# Patient Record
Sex: FEMALE | Race: WHITE | HISPANIC OR LATINO | Employment: PART TIME | URBAN - METROPOLITAN AREA
[De-identification: names, ages, dates, MRNs, and addresses within clinical notes are randomized per-mention and may not be internally consistent; named-entity substitution may affect disease eponyms.]

---

## 2022-12-16 ENCOUNTER — OFFICE VISIT (OUTPATIENT)
Dept: FAMILY MEDICINE CLINIC | Facility: CLINIC | Age: 22
End: 2022-12-16

## 2022-12-16 VITALS
DIASTOLIC BLOOD PRESSURE: 62 MMHG | BODY MASS INDEX: 19.79 KG/M2 | WEIGHT: 130.6 LBS | HEIGHT: 68 IN | SYSTOLIC BLOOD PRESSURE: 116 MMHG | TEMPERATURE: 97.5 F | OXYGEN SATURATION: 99 % | HEART RATE: 102 BPM

## 2022-12-16 DIAGNOSIS — F33.2 SEVERE EPISODE OF RECURRENT MAJOR DEPRESSIVE DISORDER, WITHOUT PSYCHOTIC FEATURES: ICD-10-CM

## 2022-12-16 DIAGNOSIS — J02.9 PHARYNGITIS, UNSPECIFIED ETIOLOGY: ICD-10-CM

## 2022-12-16 DIAGNOSIS — Z76.89 ENCOUNTER TO ESTABLISH CARE: Primary | ICD-10-CM

## 2022-12-16 DIAGNOSIS — F41.1 GENERALIZED ANXIETY DISORDER: ICD-10-CM

## 2022-12-16 PROCEDURE — 99204 OFFICE O/P NEW MOD 45 MIN: CPT

## 2022-12-16 RX ORDER — CEFDINIR 300 MG/1
300 CAPSULE ORAL 2 TIMES DAILY
Qty: 20 CAPSULE | Refills: 0 | Status: SHIPPED | OUTPATIENT
Start: 2022-12-16 | End: 2022-12-26

## 2022-12-16 RX ORDER — FLUOXETINE HYDROCHLORIDE 20 MG/1
20 CAPSULE ORAL DAILY
COMMUNITY
End: 2023-01-09

## 2022-12-16 RX ORDER — VILAZODONE HYDROCHLORIDE 20 MG/1
20 TABLET ORAL DAILY
Qty: 30 TABLET | Refills: 2 | Status: SHIPPED | OUTPATIENT
Start: 2022-12-16 | End: 2023-01-23

## 2022-12-16 RX ORDER — VILAZODONE HYDROCHLORIDE 10 MG/1
10 TABLET ORAL DAILY
Qty: 7 TABLET | Refills: 0 | Status: SHIPPED | OUTPATIENT
Start: 2022-12-16 | End: 2023-01-09

## 2022-12-16 RX ORDER — FLUOXETINE 10 MG/1
10 CAPSULE ORAL DAILY
Qty: 21 CAPSULE | Refills: 0 | Status: SHIPPED | OUTPATIENT
Start: 2022-12-16 | End: 2023-01-03

## 2022-12-16 RX ORDER — AZITHROMYCIN 250 MG/1
250 TABLET, FILM COATED ORAL DAILY
COMMUNITY
End: 2022-12-16

## 2022-12-16 NOTE — PROGRESS NOTES
Chief Complaint  Chief Complaint   Patient presents with   • Depression   • Anxiety   • Establish Care       Subjective      Juliet Pittman presents to [facility]  History of Present Illness  Patient presents today to establish care. She recently moved to the area from [place] with her Mother. She will be starting a new job at the [employer] soon and is looking forward to that.     She has a history of depression and anxiety. She has taken multiple medications in the past including Effexor, Zoloft, Lexapro and is currently taking Prozac 20 mg daily. She has been on this same dose for about a year and a half. Since moving here she feels more down and depressed. She has no appetite and no motivation. These previous medications were not effective for her depression.    She also reports significant anxiety. She has not taken any anti anxiety medications.     She had previously been on birth control, OCPs, but she stopped taking those and since has had irregular periods. She also has worsening of acne since stopping. She has taken accutane in the past but is not currently taking anything.  Patient wants to focus on her mental health at this time and in the future may want to be referred to dermatology for further treatment with Accutane.        GOLD - 7 Anxiety    Date data was collected: 12/16/2022    Over the last 2 weeks, how often have you been bothered by any of the following problems?    1. Feeling nervous, anxious or on edge: Nearly every day = 3   2. Not being able to stop or control worrying Nearly every day = 3   3. Worrying too much about different things: Nearly every day = 3   4. Trouble relaxing: Not at all = 0   5. Being so restless that it is hard to sit still: More than half the days = 2   6. Becoming easily annoyed or irritable: Several Days = 1   7. Feeling afraid as if something awful might happen: Several Days = 1    Total Score 13     If you checked off any problems,  "how difficult have these problems made it for you to do your work, take care of things at home or get along with other people? Very difficult    Total score at last visit: n/a  Date of last visit: n/a    ______________________________________________________________________  GOLD-7 SCORING CARD FOR SEVERITY DETERMINATION    Scoring -- add up all answers  Not at all =0; Serveral Days = 1; More than half the days - 2; Nearly every day =3      Interpretation of Total Score  Total Score Anxiety Severity   0-5 Mild   6-10 Moderate   11-15 Moderately Severe   15-21 Severe         Objective     Medical History:  Past Medical History:   Diagnosis Date   • Anxiety    • Depression      History reviewed. No pertinent surgical history.   Social History     Tobacco Use   • Smoking status: Never     Passive exposure: Never   • Smokeless tobacco: Never   Vaping Use   • Vaping Use: Never used   Substance Use Topics   • Alcohol use: Never   • Drug use: Never     Family History   Problem Relation Age of Onset   • Diabetes Father        Medications:  Prior to Admission medications    Medication Sig Start Date End Date Taking? Authorizing Provider   azithromycin (ZITHROMAX) 250 MG tablet Take 250 mg by mouth Daily.   Yes Provider, MD David   FLUoxetine (PROzac) 20 MG capsule Take 20 mg by mouth Daily.   Yes Provider, Historical, MD        Allergies:   Amoxicillin    Health Maintenance Due   Topic Date Due   • HPV VACCINES (1 - 2-dose series) Never done   • TDAP/TD VACCINES (1 - Tdap) Never done   • HEPATITIS C SCREENING  Never done   • ANNUAL PHYSICAL  Never done         Vital Signs:   /62   Pulse 102   Temp 97.5 °F (36.4 °C)   Ht 172.7 cm (68\")   Wt 59.2 kg (130 lb 9.6 oz)   SpO2 99%   BMI 19.86 kg/m²     Wt Readings from Last 3 Encounters:   12/16/22 59.2 kg (130 lb 9.6 oz)     BP Readings from Last 3 Encounters:   12/16/22 116/62       BMI is within normal parameters. No other follow-up for BMI required.   "     Physical Exam  Vitals reviewed.   Constitutional:       Appearance: Normal appearance. She is well-developed.   HENT:      Head: Normocephalic and atraumatic.      Right Ear: Tympanic membrane and external ear normal.      Left Ear: Tympanic membrane and external ear normal.      Mouth/Throat:      Pharynx: Pharyngeal swelling, oropharyngeal exudate and posterior oropharyngeal erythema present.      Tonsils: 1+ on the right. 1+ on the left.   Eyes:      Conjunctiva/sclera: Conjunctivae normal.      Pupils: Pupils are equal, round, and reactive to light.   Cardiovascular:      Rate and Rhythm: Normal rate and regular rhythm.      Heart sounds: No murmur heard.    No friction rub. No gallop.   Pulmonary:      Effort: Pulmonary effort is normal.      Breath sounds: Normal breath sounds. No wheezing or rhonchi.   Abdominal:      General: Bowel sounds are normal. There is no distension.      Palpations: Abdomen is soft.      Tenderness: There is no abdominal tenderness.   Skin:     General: Skin is warm and dry.   Neurological:      Mental Status: She is alert and oriented to person, place, and time.      Cranial Nerves: No cranial nerve deficit.   Psychiatric:         Mood and Affect: Affect normal. Mood is anxious.         Behavior: Behavior normal.         Thought Content: Thought content normal.         Judgment: Judgment normal.          Result Review :    The following data was reviewed by ISABELLE Tomlin on 12/16/22 at 16:48 EST:        No Images in the past 120 days found..                  Assessment and Plan    Diagnoses and all orders for this visit:    1. Encounter to establish care (Primary)    2. Severe episode of recurrent major depressive disorder, without psychotic features (HCC)  -     FLUoxetine (PROzac) 10 MG capsule; Take 1 capsule by mouth Daily.  Dispense: 21 capsule; Refill: 0  -     vilazodone (Viibryd) 10 MG tablet tablet; Take 1 tablet by mouth Daily.  Dispense: 7 tablet;  Refill: 0  -     vilazodone (Viibryd) 20 MG tablet tablet; Take 1 tablet by mouth Daily.  Dispense: 30 tablet; Refill: 2    3. Generalized anxiety disorder  -     FLUoxetine (PROzac) 10 MG capsule; Take 1 capsule by mouth Daily.  Dispense: 21 capsule; Refill: 0  -     vilazodone (Viibryd) 10 MG tablet tablet; Take 1 tablet by mouth Daily.  Dispense: 7 tablet; Refill: 0  -     vilazodone (Viibryd) 20 MG tablet tablet; Take 1 tablet by mouth Daily.  Dispense: 30 tablet; Refill: 2    4. Pharyngitis, unspecified etiology  -     cefdinir (OMNICEF) 300 MG capsule; Take 1 capsule by mouth 2 (Two) Times a Day for 10 days.  Dispense: 20 capsule; Refill: 0       Once patient has Viibryd in hand she will decrease Prozac to 10 mg daily and begin taking Viibryd 10 mg daily for 1 week.  She will then increase Viibryd dose to 20 mg daily and continue Prozac 10 mg daily as needed until able to wean off of medication with no adverse withdrawal effects.      Smoking Cessation:    Juliet Pittman  reports that she has never smoked. She has never been exposed to tobacco smoke. She has never used smokeless tobacco.            Follow Up   Return in about 1 month (around 1/16/2023) for Recheck depression, anxiety .  Patient was given instructions and counseling regarding her condition or for health maintenance advice. Please see specific information pulled into the AVS if appropriate.     Please note that portions of this note were completed with a voice recognition program.

## 2022-12-20 DIAGNOSIS — F33.2 SEVERE EPISODE OF RECURRENT MAJOR DEPRESSIVE DISORDER, WITHOUT PSYCHOTIC FEATURES: ICD-10-CM

## 2022-12-20 DIAGNOSIS — F41.1 GENERALIZED ANXIETY DISORDER: ICD-10-CM

## 2022-12-21 RX ORDER — VILAZODONE HYDROCHLORIDE 10 MG/1
10 TABLET ORAL DAILY
Qty: 7 TABLET | Refills: 0 | OUTPATIENT
Start: 2022-12-21

## 2023-01-03 DIAGNOSIS — F41.1 GENERALIZED ANXIETY DISORDER: ICD-10-CM

## 2023-01-03 DIAGNOSIS — F33.2 SEVERE EPISODE OF RECURRENT MAJOR DEPRESSIVE DISORDER, WITHOUT PSYCHOTIC FEATURES: ICD-10-CM

## 2023-01-03 RX ORDER — FLUOXETINE 10 MG/1
10 CAPSULE ORAL DAILY
Qty: 21 CAPSULE | Refills: 0 | Status: SHIPPED | OUTPATIENT
Start: 2023-01-03 | End: 2023-01-09

## 2023-01-09 ENCOUNTER — OFFICE VISIT (OUTPATIENT)
Dept: FAMILY MEDICINE CLINIC | Facility: CLINIC | Age: 23
End: 2023-01-09
Payer: COMMERCIAL

## 2023-01-09 VITALS
BODY MASS INDEX: 20 KG/M2 | OXYGEN SATURATION: 99 % | HEART RATE: 94 BPM | DIASTOLIC BLOOD PRESSURE: 58 MMHG | SYSTOLIC BLOOD PRESSURE: 102 MMHG | TEMPERATURE: 97.8 F | HEIGHT: 68 IN | WEIGHT: 132 LBS

## 2023-01-09 DIAGNOSIS — F41.1 GENERALIZED ANXIETY DISORDER: ICD-10-CM

## 2023-01-09 DIAGNOSIS — J02.9 PHARYNGITIS, UNSPECIFIED ETIOLOGY: Primary | ICD-10-CM

## 2023-01-09 DIAGNOSIS — F33.2 SEVERE EPISODE OF RECURRENT MAJOR DEPRESSIVE DISORDER, WITHOUT PSYCHOTIC FEATURES: ICD-10-CM

## 2023-01-09 DIAGNOSIS — R53.83 FATIGUE, UNSPECIFIED TYPE: ICD-10-CM

## 2023-01-09 DIAGNOSIS — L50.9 FULL BODY HIVES: ICD-10-CM

## 2023-01-09 LAB
EXPIRATION DATE: NORMAL
INTERNAL CONTROL: NORMAL
Lab: NORMAL
S PYO AG THROAT QL: NEGATIVE

## 2023-01-09 PROCEDURE — 86308 HETEROPHILE ANTIBODY SCREEN: CPT

## 2023-01-09 PROCEDURE — 80048 BASIC METABOLIC PNL TOTAL CA: CPT

## 2023-01-09 PROCEDURE — 99214 OFFICE O/P EST MOD 30 MIN: CPT

## 2023-01-09 PROCEDURE — 85025 COMPLETE CBC W/AUTO DIFF WBC: CPT

## 2023-01-09 PROCEDURE — 87880 STREP A ASSAY W/OPTIC: CPT

## 2023-01-09 PROCEDURE — 87081 CULTURE SCREEN ONLY: CPT

## 2023-01-09 RX ORDER — METHYLPREDNISOLONE 4 MG/1
TABLET ORAL
Qty: 21 TABLET | Refills: 0 | Status: SHIPPED | OUTPATIENT
Start: 2023-01-09 | End: 2023-01-23

## 2023-01-09 NOTE — PROGRESS NOTES
Chief Complaint  Chief Complaint   Patient presents with   • Sore Throat       Subjective      Juliet Pittman presents to [facility]  History of Present Illness  Patient presents today with complaints of swollen tonsils and widespread hives covering her chest, torso, back and now spreading to her legs and arms.  She denies any shortness of breath, difficulty breathing, wheezing, pain or sore throat, fever or chills.  She was initially seen about a month ago and treated for similar symptoms that were believed to be streptococcal infection.  She has an allergy to amoxicillin and was treated with cefdinir.  She completed this course of antibiotics and was subsequently treated for a vaginal yeast infection with oral Diflucan as prescribed by telehealth doc.  Patient states that her throat is no longer sore as it was previously but otherwise symptoms have not resolved.  She has been very tired and fatigued, stating that she has not been able to sleep well at nighttime and she is taking frequent naps.    Patient does report that she has a history of an allergy to red dye and did recently have red Hugh-A[employer] a few days ago.  She has had red dye within the last few years with no reaction so she is not sure if this is related.    Patient was previously transitioned from Prozac to Viibryd for depression and anxiety.  She reports that she still feels that her mental health has much room for improvement and has requested a referral to see psychiatry.  She reports significant mental health family history on her dad's side but that she has never been close with her dad and does not know the extent of his mental illness.     Objective     Medical History:  Past Medical History:   Diagnosis Date   • Anxiety    • Depression      History reviewed. No pertinent surgical history.   Social History     Tobacco Use   • Smoking status: Never     Passive exposure: Never   • Smokeless tobacco: Never   Vaping Use    • Vaping Use: Never used   Substance Use Topics   • Alcohol use: Never   • Drug use: Never     Family History   Problem Relation Age of Onset   • Diabetes Father    • Depression Father        Medications:  Prior to Admission medications    Medication Sig Start Date End Date Taking? Authorizing Provider   FLUoxetine (PROzac) 10 MG capsule TAKE 1 CAPSULE BY MOUTH DAILY 1/3/23  Yes Rocío Geronimo APRN   FLUoxetine (PROzac) 20 MG capsule Take 20 mg by mouth Daily.   Yes Provider, MD David   vilazodone (Viibryd) 10 MG tablet tablet Take 1 tablet by mouth Daily. 12/16/22  Yes Rocío Geronimo APRN   vilazodone (Viibryd) 20 MG tablet tablet Take 1 tablet by mouth Daily. 12/16/22  Yes Rocío Geronimo APRN        Allergies:   Amoxicillin    Health Maintenance Due   Topic Date Due   • COVID-19 Vaccine (1) Never done   • HPV VACCINES (1 - 2-dose series) Never done   • TDAP/TD VACCINES (1 - Tdap) Never done   • HEPATITIS C SCREENING  Never done   • ANNUAL PHYSICAL  Never done   • PAP SMEAR  Never done         Vital Signs:   /58   Pulse 94   Temp 97.8 °F (36.6 °C)   Ht 172.7 cm (68\")   Wt 59.9 kg (132 lb)   SpO2 99%   BMI 20.07 kg/m²     Wt Readings from Last 3 Encounters:   01/09/23 59.9 kg (132 lb)   12/16/22 59.2 kg (130 lb 9.6 oz)     BP Readings from Last 3 Encounters:   01/09/23 102/58   12/16/22 116/62       BMI is within normal parameters. No other follow-up for BMI required.       Physical Exam  Vitals reviewed.   Constitutional:       Appearance: Normal appearance. She is well-developed.   HENT:      Head: Normocephalic and atraumatic.      Right Ear: External ear normal.      Left Ear: External ear normal.      Nose: No congestion or rhinorrhea.      Mouth/Throat:      Pharynx: Oropharyngeal exudate and posterior oropharyngeal erythema present.      Tonsils: Tonsillar exudate present. 2+ on the right. 2+ on the left.   Eyes:      Conjunctiva/sclera: Conjunctivae normal.       Pupils: Pupils are equal, round, and reactive to light.   Cardiovascular:      Rate and Rhythm: Normal rate and regular rhythm.      Heart sounds: No murmur heard.    No friction rub. No gallop.   Pulmonary:      Effort: Pulmonary effort is normal.      Breath sounds: Normal breath sounds. No wheezing or rhonchi.   Abdominal:      General: Bowel sounds are normal. There is no distension.      Palpations: Abdomen is soft.      Tenderness: There is no abdominal tenderness.   Lymphadenopathy:      Cervical: Cervical adenopathy present.   Skin:     General: Skin is warm and dry.      Findings: Rash present. Rash is macular and papular.   Neurological:      Mental Status: She is alert and oriented to person, place, and time.      Cranial Nerves: No cranial nerve deficit.   Psychiatric:         Mood and Affect: Mood and affect normal.         Behavior: Behavior normal.         Thought Content: Thought content normal.         Judgment: Judgment normal.          Result Review :    The following data was reviewed by ISABELLE Tomlin on 01/09/23 at 10:27 EST:        Strep    Common Labsle 1/9/23   POC Strep A, Molecular Negative             No Images in the past 120 days found..                  Assessment and Plan    Diagnoses and all orders for this visit:    1. Pharyngitis, unspecified etiology (Primary)  -     POCT rapid strep A  -     Beta Strep Culture, Throat - Swab, Throat  -     Mononucleosis Test, Qual With Reflex  -     CBC w AUTO Differential  -     Basic metabolic panel  -     methylPREDNISolone (MEDROL) 4 MG dose pack; Take as directed on package instructions.  Dispense: 21 tablet; Refill: 0    2. Full body hives  -     Beta Strep Culture, Throat - Swab, Throat  -     Mononucleosis Test, Qual With Reflex  -     CBC w AUTO Differential  -     Basic metabolic panel  -     methylPREDNISolone (MEDROL) 4 MG dose pack; Take as directed on package instructions.  Dispense: 21 tablet; Refill: 0    3.  Generalized anxiety disorder  -     Ambulatory Referral to Psychiatry    4. Severe episode of recurrent major depressive disorder, without psychotic features (HCC)  -     Ambulatory Referral to Psychiatry    5. Fatigue, unspecified type    Patient was advised to begin taking Viibryd in the mornings rather than in the evenings.  She will be referred to psychiatry for further evaluation and alternative treatment plan if indicated.  Patient was previously seeing a therapist prior to moving to the area and is interested in seeing psychiatry and speaking with a therapist as well.    Patient will be tested for mononucleosis today as well as a throat culture to determine source of pharyngitis and hives.  She will be prescribed a Medrol Dosepak today and was advised to take Benadryl 25 to 50 mg every 4-6 hours for the next 24 to 48 hours.  Patient was provided a work excuse for the next 2 evenings with return to work on Wednesday, January 11 if symptoms improve.  She will follow-up with me in 2 months to continue monitoring these symptoms.        Smoking Cessation:    Juliet Pittman  reports that she has never smoked. She has never been exposed to tobacco smoke. She has never used smokeless tobacco.            Follow Up   Return in about 2 weeks (around 1/23/2023) for Recheck.  Patient was given instructions and counseling regarding her condition or for health maintenance advice. Please see specific information pulled into the AVS if appropriate.     Please note that portions of this note were completed with a voice recognition program.    Answers for HPI/ROS submitted by the patient on 1/9/2023  Please describe your symptoms.: Hives, swollen tonsils, bad mental health  Have you had these symptoms before?: Yes  How long have you been having these symptoms?: 1-4 days  Please list any medications you are currently taking for this condition.: Allegra for hives, took antibiotics for throat and a yeast infection pill, the  antidepressant you prescribed  Please describe any probable cause for these symptoms. : Thought the hives were from koolaid now think its stress but dont know.  What is the primary reason for your visit?: Other

## 2023-01-09 NOTE — LETTER
January 9, 2023     Patient: Juliet Pittman   YOB: 2000   Date of Visit: 1/9/2023       To Whom It May Concern:    It is my medical opinion that Juliet Pittman may return to work in two days on 1/11/2023.         Sincerely,        ISABELLE Tomlin    CC: No Recipients

## 2023-01-10 LAB
ANION GAP SERPL CALCULATED.3IONS-SCNC: 10.1 MMOL/L (ref 5–15)
BASOPHILS # BLD AUTO: 0.01 10*3/MM3 (ref 0–0.2)
BASOPHILS NFR BLD AUTO: 0.2 % (ref 0–1.5)
BUN SERPL-MCNC: 9 MG/DL (ref 6–20)
BUN/CREAT SERPL: 13.4 (ref 7–25)
CALCIUM SPEC-SCNC: 9.5 MG/DL (ref 8.6–10.5)
CHLORIDE SERPL-SCNC: 105 MMOL/L (ref 98–107)
CO2 SERPL-SCNC: 26.9 MMOL/L (ref 22–29)
CREAT SERPL-MCNC: 0.67 MG/DL (ref 0.57–1)
DEPRECATED RDW RBC AUTO: 40.5 FL (ref 37–54)
EGFRCR SERPLBLD CKD-EPI 2021: 127.7 ML/MIN/1.73
EOSINOPHIL # BLD AUTO: 0.11 10*3/MM3 (ref 0–0.4)
EOSINOPHIL NFR BLD AUTO: 1.7 % (ref 0.3–6.2)
ERYTHROCYTE [DISTWIDTH] IN BLOOD BY AUTOMATED COUNT: 12.1 % (ref 12.3–15.4)
GLUCOSE SERPL-MCNC: 94 MG/DL (ref 65–99)
HCT VFR BLD AUTO: 39 % (ref 34–46.6)
HGB BLD-MCNC: 13.5 G/DL (ref 12–15.9)
IMM GRANULOCYTES # BLD AUTO: 0.02 10*3/MM3 (ref 0–0.05)
IMM GRANULOCYTES NFR BLD AUTO: 0.3 % (ref 0–0.5)
LYMPHOCYTES # BLD AUTO: 1.68 10*3/MM3 (ref 0.7–3.1)
LYMPHOCYTES NFR BLD AUTO: 26.5 % (ref 19.6–45.3)
MCH RBC QN AUTO: 31.8 PG (ref 26.6–33)
MCHC RBC AUTO-ENTMCNC: 34.6 G/DL (ref 31.5–35.7)
MCV RBC AUTO: 91.8 FL (ref 79–97)
MONOCYTES # BLD AUTO: 0.56 10*3/MM3 (ref 0.1–0.9)
MONOCYTES NFR BLD AUTO: 8.8 % (ref 5–12)
NEUTROPHILS NFR BLD AUTO: 3.95 10*3/MM3 (ref 1.7–7)
NEUTROPHILS NFR BLD AUTO: 62.5 % (ref 42.7–76)
NRBC BLD AUTO-RTO: 0 /100 WBC (ref 0–0.2)
PLATELET # BLD AUTO: 267 10*3/MM3 (ref 140–450)
PMV BLD AUTO: 10.3 FL (ref 6–12)
POTASSIUM SERPL-SCNC: 3.9 MMOL/L (ref 3.5–5.2)
RBC # BLD AUTO: 4.25 10*6/MM3 (ref 3.77–5.28)
SODIUM SERPL-SCNC: 142 MMOL/L (ref 136–145)
WBC NRBC COR # BLD: 6.33 10*3/MM3 (ref 3.4–10.8)

## 2023-01-11 LAB
BACTERIA SPEC AEROBE CULT: NORMAL
HETEROPH AB SER QL LA: NEGATIVE

## 2023-01-16 ENCOUNTER — TELEPHONE (OUTPATIENT)
Dept: FAMILY MEDICINE CLINIC | Facility: CLINIC | Age: 23
End: 2023-01-16

## 2023-01-17 DIAGNOSIS — F41.1 GENERALIZED ANXIETY DISORDER: Primary | ICD-10-CM

## 2023-01-17 DIAGNOSIS — F33.2 SEVERE EPISODE OF RECURRENT MAJOR DEPRESSIVE DISORDER, WITHOUT PSYCHOTIC FEATURES: ICD-10-CM

## 2023-01-17 RX ORDER — ARIPIPRAZOLE 2 MG/1
2 TABLET ORAL DAILY
Qty: 30 TABLET | Refills: 2 | Status: SHIPPED | OUTPATIENT
Start: 2023-01-17 | End: 2023-01-23

## 2023-01-23 ENCOUNTER — OFFICE VISIT (OUTPATIENT)
Dept: FAMILY MEDICINE CLINIC | Facility: CLINIC | Age: 23
End: 2023-01-23
Payer: COMMERCIAL

## 2023-01-23 VITALS
TEMPERATURE: 97.7 F | HEART RATE: 90 BPM | DIASTOLIC BLOOD PRESSURE: 80 MMHG | OXYGEN SATURATION: 99 % | WEIGHT: 131.2 LBS | SYSTOLIC BLOOD PRESSURE: 118 MMHG | BODY MASS INDEX: 19.88 KG/M2 | HEIGHT: 68 IN

## 2023-01-23 DIAGNOSIS — J03.90 TONSILLITIS: ICD-10-CM

## 2023-01-23 DIAGNOSIS — F33.2 SEVERE EPISODE OF RECURRENT MAJOR DEPRESSIVE DISORDER, WITHOUT PSYCHOTIC FEATURES: Primary | ICD-10-CM

## 2023-01-23 DIAGNOSIS — F41.1 GENERALIZED ANXIETY DISORDER: ICD-10-CM

## 2023-01-23 PROCEDURE — 99214 OFFICE O/P EST MOD 30 MIN: CPT

## 2023-01-23 RX ORDER — FLUOXETINE HYDROCHLORIDE 20 MG/1
20 CAPSULE ORAL DAILY
Qty: 30 CAPSULE | Refills: 2 | Status: SHIPPED | OUTPATIENT
Start: 2023-01-23 | End: 2023-02-10 | Stop reason: SDUPTHER

## 2023-01-23 RX ORDER — FLUOXETINE HYDROCHLORIDE 20 MG/1
20 CAPSULE ORAL DAILY
COMMUNITY
End: 2023-01-23 | Stop reason: SDUPTHER

## 2023-01-23 NOTE — PROGRESS NOTES
Chief Complaint  Chief Complaint   Patient presents with   • Sore Throat     2 week follow up       Subjective      Juliet Pittman presents to [facility]  History of Present Illness  Patient presents today for follow up visit.     She continues to have swollen tonsils. She has been treated with multiple rounds of antibiotics.  She is allergic to amoxicillin and was prescribed cefdinir at her initial visit to establish care on 12/16/2022.  She initially was positive for strep and was treated accordingly sometime around November. Subsequent tests including throat cultures have all been negative. Mono test has been negative. She denies any pain or difficulty swallowing.  She denies any fever or chills, nausea vomiting or diarrhea.    Patient was initially started on Vilazodone for depression and anxiety. She had previously tried several other medications but felt that they were not effective. This medication caused her to breakout in hives. She has since stopped taking it and began taking Prozac that was previously prescribed to her by different provider. She would like to continue taking this medication. She has recently started a new job and is trying to go to the gym as well to help with her mental health. She has an upcoming appointment with psychiatry on 2/10/2023.     Objective     Medical History:  Past Medical History:   Diagnosis Date   • Anxiety    • Depression      History reviewed. No pertinent surgical history.   Social History     Tobacco Use   • Smoking status: Never     Passive exposure: Never   • Smokeless tobacco: Never   Vaping Use   • Vaping Use: Never used   Substance Use Topics   • Alcohol use: Never   • Drug use: Never     Family History   Problem Relation Age of Onset   • Diabetes Father    • Depression Father        Medications:  Prior to Admission medications    Medication Sig Start Date End Date Taking? Authorizing Provider   FLUoxetine (PROzac) 20 MG capsule  "Take 20 mg by mouth Daily.   Yes Provider, MD David   ARIPiprazole (Abilify) 2 MG tablet Take 1 tablet by mouth Daily. 1/17/23   Rocío Geronimo APRN   methylPREDNISolone (MEDROL) 4 MG dose pack Take as directed on package instructions. 1/9/23   Rocío Geronimo APRN   vilazodone (Viibryd) 20 MG tablet tablet Take 1 tablet by mouth Daily. 12/16/22   Rocío Geronimo APRN        Allergies:   Amoxicillin, Vilazodone, and Vilazodone hcl    Health Maintenance Due   Topic Date Due   • HPV VACCINES (1 - 2-dose series) Never done   • TDAP/TD VACCINES (1 - Tdap) Never done   • HEPATITIS C SCREENING  Never done   • ANNUAL PHYSICAL  Never done   • PAP SMEAR  Never done         Vital Signs:   /80   Pulse 90   Temp 97.7 °F (36.5 °C)   Ht 172.7 cm (68\")   Wt 59.5 kg (131 lb 3.2 oz)   SpO2 99%   BMI 19.95 kg/m²     Wt Readings from Last 3 Encounters:   01/23/23 59.5 kg (131 lb 3.2 oz)   01/09/23 59.9 kg (132 lb)   12/16/22 59.2 kg (130 lb 9.6 oz)     BP Readings from Last 3 Encounters:   01/23/23 118/80   01/09/23 102/58   12/16/22 116/62       BMI is within normal parameters. No other follow-up for BMI required.       Physical Exam  Vitals reviewed.   Constitutional:       Appearance: Normal appearance. She is well-developed.   HENT:      Head: Normocephalic and atraumatic.      Right Ear: Tympanic membrane normal.      Left Ear: Tympanic membrane normal.      Nose: No congestion or rhinorrhea.      Mouth/Throat:      Pharynx: Posterior oropharyngeal erythema present. No oropharyngeal exudate.      Tonsils: 1+ on the right. 1+ on the left.   Eyes:      Conjunctiva/sclera: Conjunctivae normal.      Pupils: Pupils are equal, round, and reactive to light.   Cardiovascular:      Rate and Rhythm: Normal rate and regular rhythm.      Heart sounds: No murmur heard.    No friction rub. No gallop.   Pulmonary:      Effort: Pulmonary effort is normal.      Breath sounds: Normal breath sounds. No " wheezing or rhonchi.   Abdominal:      General: Bowel sounds are normal. There is no distension.      Palpations: Abdomen is soft.      Tenderness: There is no abdominal tenderness.   Skin:     General: Skin is warm and dry.   Neurological:      Mental Status: She is alert and oriented to person, place, and time.      Cranial Nerves: No cranial nerve deficit.   Psychiatric:         Mood and Affect: Mood and affect normal.         Behavior: Behavior normal.         Thought Content: Thought content normal.         Judgment: Judgment normal.          Result Review :    The following data was reviewed by ISABELLE Tomlin on 01/23/23 at 14:05 EST:    Common labs    Common Labs 1/9/23 1/9/23    1026 1026   Glucose  94   BUN  9   Creatinine  0.67   Sodium  142   Potassium  3.9   Chloride  105   Calcium  9.5   WBC 6.33    Hemoglobin 13.5    Hematocrit 39.0    Platelets 267            Internal Control   Date Value Ref Range Status   01/09/2023 Passed Passed Final     Lot Number   Date Value Ref Range Status   01/09/2023 1,330,700  Final     Expiration Date   Date Value Ref Range Status   01/09/2023 91,324  Final             Strep    Common Labsle 1/9/23   POC Strep A, Molecular Negative                 No Images in the past 120 days found..                  Assessment and Plan    Diagnoses and all orders for this visit:    1. Severe episode of recurrent major depressive disorder, without psychotic features (HCC) (Primary)  -     FLUoxetine (PROzac) 20 MG capsule; Take 1 capsule by mouth Daily.  Dispense: 30 capsule; Refill: 2    2. Generalized anxiety disorder  -     FLUoxetine (PROzac) 20 MG capsule; Take 1 capsule by mouth Daily.  Dispense: 30 capsule; Refill: 2    3. Tonsillitis  -     Ambulatory Referral to ENT (Otolaryngology)    Patient will see psychiatry next month for further evaluation of depression and anxiety.  She will continue taking Prozac 20 mg that she was previously prescribed as she feels that  this overall is effective for her.  Patient will be referred to ENT for further evaluation of chronic tonsillitis.        Smoking Cessation:    Juliet Pittman  reports that she has never smoked. She has never been exposed to tobacco smoke. She has never used smokeless tobacco.            Follow Up   Return if symptoms worsen or fail to improve.  Patient was given instructions and counseling regarding her condition or for health maintenance advice. Please see specific information pulled into the AVS if appropriate.     Please note that portions of this note were completed with a voice recognition program.    Answers for HPI/ROS submitted by the patient on 1/23/2023  Please describe your symptoms.: Swollen tonsils  Have you had these symptoms before?: Yes  How long have you been having these symptoms?: Greater than 2 weeks  What is the primary reason for your visit?: Other

## 2023-02-10 ENCOUNTER — OFFICE VISIT (OUTPATIENT)
Dept: BEHAVIORAL HEALTH | Facility: CLINIC | Age: 23
End: 2023-02-10
Payer: COMMERCIAL

## 2023-02-10 VITALS
WEIGHT: 131 LBS | DIASTOLIC BLOOD PRESSURE: 82 MMHG | BODY MASS INDEX: 19.85 KG/M2 | HEART RATE: 87 BPM | SYSTOLIC BLOOD PRESSURE: 112 MMHG | HEIGHT: 68 IN

## 2023-02-10 DIAGNOSIS — F41.1 GENERALIZED ANXIETY DISORDER: ICD-10-CM

## 2023-02-10 DIAGNOSIS — F33.2 SEVERE EPISODE OF RECURRENT MAJOR DEPRESSIVE DISORDER, WITHOUT PSYCHOTIC FEATURES: Primary | ICD-10-CM

## 2023-02-10 DIAGNOSIS — G47.00 INSOMNIA, UNSPECIFIED TYPE: ICD-10-CM

## 2023-02-10 PROCEDURE — 90792 PSYCH DIAG EVAL W/MED SRVCS: CPT | Performed by: PHYSICIAN ASSISTANT

## 2023-02-10 RX ORDER — FLUOXETINE 10 MG/1
CAPSULE ORAL
Qty: 90 CAPSULE | Refills: 0 | Status: SHIPPED | OUTPATIENT
Start: 2023-02-10 | End: 2023-03-16

## 2023-02-10 RX ORDER — UREA 10 %
LOTION (ML) TOPICAL
COMMUNITY

## 2023-02-10 RX ORDER — FLUOXETINE HYDROCHLORIDE 20 MG/1
CAPSULE ORAL
Qty: 90 CAPSULE | Refills: 0 | Status: SHIPPED | OUTPATIENT
Start: 2023-02-10 | End: 2023-03-16

## 2023-02-10 NOTE — PROGRESS NOTES
Chief Complaint:  Depression, anxiety     History of Present Illness: Juliet Pittman is a 23 y.o. female who presents today referred by PCP Rocío WALTON. Pt c/o depression that is constant and fluctuates. Rates depression over the past week 8/10. She also c/o anhedonia. Pt reports sleep fluctuates with sleeping too much or not enough. She also c/o fatigue. Pt admits to constant passive SI, and consists of not wanting to be here.  Patient denies having any plan.  No access to firearms.  Patient denies history of suicide attempt or self-harm.  No HI.  Pt c/o constant anxiety and worries about everything. Her anxiety is worse in social situations. No recent panic attacks. She also c/o feeling on edge and easily irritable. No symptoms of PTSD. Pt denies AVH. Pt reports being concerned about autism as she has sensory issues, routine with eating foods. No difficulty with nonverbal cues. H/o grandmother with autism.  Patient is currently on Prozac and feels like this has helped her moods not be as extreme since being on this med.      Medical Record Review: Reviewed office visit note from 1/23/23, Patient was initially started on Vilazodone for depression and anxiety. She had previously tried several other medications but felt that they were not effective. This medication caused her to breakout in hives. She has since stopped taking it and began taking Prozac that was previously prescribed to her by different provider. She would like to continue taking this medication. She has recently started a new job and is trying to go to the gym as well to help with her mental health. She has an upcoming appointment with psychiatry on 2/10/2023.       PHQ-9 Depression Screening  Little interest or pleasure in doing things? 2-->more than half the days   Feeling down, depressed, or hopeless? 2-->more than half the days   Trouble falling or staying asleep, or sleeping too much? 3-->nearly every day   Feeling tired or having  little energy? 3-->nearly every day   Poor appetite or overeating? 1-->several days   Feeling bad about yourself - or that you are a failure or have let yourself or your family down? 2-->more than half the days   Trouble concentrating on things, such as reading the newspaper or watching television? 2-->more than half the days   Moving or speaking so slowly that other people could have noticed? Or the opposite - being so fidgety or restless that you have been moving around a lot more than usual? 1-->several days   Thoughts that you would be better off dead, or of hurting yourself in some way? 1-->several days   PHQ-9 Total Score 17   If you checked off any problems, how difficult have these problems made it for you to do your work, take care of things at home, or get along with other people? somewhat difficult         GOLD-7  Feeling nervous, anxious or on edge: More than half the days  Not being able to stop or control worrying: Nearly every day  Worrying too much about different things: Nearly every day  Trouble Relaxing: Several days  Being so restless that it is hard to sit still: Several days  Feeling afraid as if something awful might happen: More than half the days  Becoming easily annoyed or irritable: More than half the days  GOLD 7 Total Score: 14  If you checked any problems, how difficult have these problems made it for you to do your work, take care of things at home, or get along with other people: Somewhat difficult      ROS:  Review of Systems   Constitutional: Positive for fatigue. Negative for appetite change, diaphoresis and unexpected weight change.   HENT: Negative for drooling, tinnitus and trouble swallowing.    Eyes: Negative for visual disturbance.   Respiratory: Negative for cough, chest tightness and shortness of breath.    Cardiovascular: Negative for chest pain and palpitations.   Gastrointestinal: Negative for abdominal pain, constipation, diarrhea, nausea and vomiting.   Endocrine:  Negative for cold intolerance and heat intolerance.   Genitourinary: Negative for difficulty urinating.   Musculoskeletal: Negative for arthralgias and myalgias.   Skin: Negative for rash.   Allergic/Immunologic: Negative for immunocompromised state.   Neurological: Negative for dizziness, tremors, seizures and headaches.   Psychiatric/Behavioral: Positive for agitation, dysphoric mood, sleep disturbance and suicidal ideas. Negative for hallucinations and self-injury. The patient is nervous/anxious.        Problem List:  Patient Active Problem List   Diagnosis   • Generalized anxiety disorder       Current Medications:   Current Outpatient Medications   Medication Sig Dispense Refill   • Cyanocobalamin (B-12 PO) Take  by mouth.     • FLUoxetine (PROzac) 20 MG capsule Take 1 cap PO QD. Take with 10mg cap for total dose of 30mg 90 capsule 0   • Iron-Vitamin C (IRON 100/C PO) Take  by mouth.     • melatonin 1 MG tablet Take  by mouth.     • Probiotic Product (PROBIOTIC-10 PO) Take  by mouth.     • FLUoxetine (PROzac) 10 MG capsule Take 1 cap PO QD. Take with 20mg cap for total dose of 30mg 90 capsule 0     No current facility-administered medications for this visit.       Discontinued Medications:  Medications Discontinued During This Encounter   Medication Reason   • FLUoxetine (PROzac) 20 MG capsule Reorder       Allergy:   Allergies   Allergen Reactions   • Amoxicillin Unknown - Low Severity   • Vilazodone Hives   • Vilazodone Hcl Hives   • Red Dye Rash        Past Medical History:  Past Medical History:   Diagnosis Date   • Anxiety    • Depression        Past Surgical History:  History reviewed. No pertinent surgical history.    Past Psychiatric History:  Began Treatment: 16 yr/o started therapy   Diagnoses: Depression, anxiety   Psychiatrist: Denies  Therapist: Pt has been in and out of therapy.   Admission History: Denies  Medications/Treatment: Lexapro (did well at first), Zoloft (decreased appetite), viibryd  "(hives), prozac  Self Harm: Denies  Suicide Attempts: Denies  Postpartum depression: N/A    Family Psychiatric History:   Diagnoses: Her grandmother has a h/o autism and her father has a h/o depression.   Substance use: Denies  Suicide Attempts/Completions: Denies    Family History   Problem Relation Age of Onset   • Diabetes Father    • Depression Father        Substance Abuse History:   Alcohol use: Rare  Nicotine: Denies  Illicit Drug Use: Denies  Longest Period Sober: Denies  Rehab/AA/NA: Denies    Social History:  Living Situation: Pt lives with her mother.  Marital/Relationship History: Single  Children: Denies  Work History/Occupation: Pt works at the [employer].   Education: Pt completed high school, some college.    History: Denies  Legal: Denies    Social History     Socioeconomic History   • Marital status: Single   Tobacco Use   • Smoking status: Never     Passive exposure: Never   • Smokeless tobacco: Never   Vaping Use   • Vaping Use: Never used   Substance and Sexual Activity   • Alcohol use: Never   • Drug use: Never   • Sexual activity: Not Currently     Partners: Male       Developmental History:   Place of birth: Pt was born in [place].   Siblings: Denies  Childhood: Pt reports mother was a single mom and was very broke growing up.       Physical Exam:  Physical Exam    Appearance: Well-groomed with adequate hygiene, appears to be of stated age. Casually and neatly dressed, maintains good eye contact.   Behavior: Appropriate, cooperative. No acute distress.  Motor: No abnormal movements, tics or tremors are noted. No psychomotor agitation or retardation.  Speech: Coherent, spontaneous, appropriate with normal rate, volume, rhythm, and tone. Normal reaction time to questions. No hyperverbal or pressured speech.   Mood: \"I'm okay\"  Affect: Patient appears slightly anxious  Thought content: Negative suicidal ideations, negative homicidal ideations. Patient denies any obsession, compulsion, or " "phobia. No evidence of delusions.  Perceptions: Negative auditory hallucinations, negative visual hallucinations. Pt does not appear to be actively responding to internal stimuli.   Thought process: Logical, goal-directed, coherent, and linear with no evidence of flight of ideas, looseness of associations, thought blocking, circumstantiality, or tangentiality.   Insight/Judgement: Fair/fair  Cognition: Alert and oriented to person, place, and date. Memory intact for recent and remote events. Attention and concentration intact.     Vital Signs:   /82   Pulse 87   Ht 172.7 cm (68\")   Wt 59.4 kg (131 lb)   BMI 19.92 kg/m²      Lab Results:   Office Visit on 01/09/2023   Component Date Value Ref Range Status   • Rapid Strep A Screen 01/09/2023 Negative  Negative, VALID, INVALID, Not Performed Final   • Internal Control 01/09/2023 Passed  Passed Final   • Lot Number 01/09/2023 [id number]   Final   • Expiration Date 01/09/2023 [id number]   Final   • Throat Culture, Beta Strep 01/09/2023 No Beta Hemolytic Streptococcus Isolated   Final   • Mono Qual W/Rflx Qn 01/09/2023 Negative  Negative Final    The sensitivity of Heterophile antibody testing is 80-90%.  Lindsey Barr IgM testing offers higher sensitivity.   • Glucose 01/09/2023 94  65 - 99 mg/dL Final   • BUN 01/09/2023 9  6 - 20 mg/dL Final   • Creatinine 01/09/2023 0.67  0.57 - 1.00 mg/dL Final   • Sodium 01/09/2023 142  136 - 145 mmol/L Final   • Potassium 01/09/2023 3.9  3.5 - 5.2 mmol/L Final   • Chloride 01/09/2023 105  98 - 107 mmol/L Final   • CO2 01/09/2023 26.9  22.0 - 29.0 mmol/L Final   • Calcium 01/09/2023 9.5  8.6 - 10.5 mg/dL Final   • BUN/Creatinine Ratio 01/09/2023 13.4  7.0 - 25.0 Final   • Anion Gap 01/09/2023 10.1  5.0 - 15.0 mmol/L Final   • eGFR 01/09/2023 127.7  >60.0 mL/min/1.73 Final    National Kidney Foundation and American Society of Nephrology (ASN) Task Force recommended calculation based on the Chronic Kidney Disease Epidemiology " Collaboration (CKD-EPI) equation refit without adjustment for race.   • WBC 01/09/2023 6.33  3.40 - 10.80 10*3/mm3 Final   • RBC 01/09/2023 4.25  3.77 - 5.28 10*6/mm3 Final   • Hemoglobin 01/09/2023 13.5  12.0 - 15.9 g/dL Final   • Hematocrit 01/09/2023 39.0  34.0 - 46.6 % Final   • MCV 01/09/2023 91.8  79.0 - 97.0 fL Final   • MCH 01/09/2023 31.8  26.6 - 33.0 pg Final   • MCHC 01/09/2023 34.6  31.5 - 35.7 g/dL Final   • RDW 01/09/2023 12.1 (L)  12.3 - 15.4 % Final   • RDW-SD 01/09/2023 40.5  37.0 - 54.0 fl Final   • MPV 01/09/2023 10.3  6.0 - 12.0 fL Final   • Platelets 01/09/2023 267  140 - 450 10*3/mm3 Final   • Neutrophil % 01/09/2023 62.5  42.7 - 76.0 % Final   • Lymphocyte % 01/09/2023 26.5  19.6 - 45.3 % Final   • Monocyte % 01/09/2023 8.8  5.0 - 12.0 % Final   • Eosinophil % 01/09/2023 1.7  0.3 - 6.2 % Final   • Basophil % 01/09/2023 0.2  0.0 - 1.5 % Final   • Immature Grans % 01/09/2023 0.3  0.0 - 0.5 % Final   • Neutrophils, Absolute 01/09/2023 3.95  1.70 - 7.00 10*3/mm3 Final   • Lymphocytes, Absolute 01/09/2023 1.68  0.70 - 3.10 10*3/mm3 Final   • Monocytes, Absolute 01/09/2023 0.56  0.10 - 0.90 10*3/mm3 Final   • Eosinophils, Absolute 01/09/2023 0.11  0.00 - 0.40 10*3/mm3 Final   • Basophils, Absolute 01/09/2023 0.01  0.00 - 0.20 10*3/mm3 Final   • Immature Grans, Absolute 01/09/2023 0.02  0.00 - 0.05 10*3/mm3 Final   • nRBC 01/09/2023 0.0  0.0 - 0.2 /100 WBC Final       EKG Results:  No orders to display       Imaging Results:  No Images in the past 120 days found..      Assessment & Plan   Diagnoses and all orders for this visit:    1. Severe episode of recurrent major depressive disorder, without psychotic features (HCC) (Primary)  -     FLUoxetine (PROzac) 10 MG capsule; Take 1 cap PO QD. Take with 20mg cap for total dose of 30mg  Dispense: 90 capsule; Refill: 0  -     FLUoxetine (PROzac) 20 MG capsule; Take 1 cap PO QD. Take with 10mg cap for total dose of 30mg  Dispense: 90 capsule; Refill: 0  -      Ambulatory Referral to Psychotherapy    2. Generalized anxiety disorder  -     FLUoxetine (PROzac) 10 MG capsule; Take 1 cap PO QD. Take with 20mg cap for total dose of 30mg  Dispense: 90 capsule; Refill: 0  -     FLUoxetine (PROzac) 20 MG capsule; Take 1 cap PO QD. Take with 10mg cap for total dose of 30mg  Dispense: 90 capsule; Refill: 0  -     Ambulatory Referral to Psychotherapy    3. Insomnia, unspecified type    Presentation seems most consistent with MDD, GOLD, insomnia.  We will increase Prozac for management depression, anxiety, and overall mood.  Discussed medication options such as trazodone for sleep, which patient declines at this time.  We will refer for [facility] to [facility].  We will provide patient with information for neuropsychological testing if she would like to proceed with being tested to rule out autism.  Follow-up in 1 month.  Addressed all questions and concerns.    Visit Diagnoses:    ICD-10-CM ICD-9-CM   1. Severe episode of recurrent major depressive disorder, without psychotic features (HCC)  F33.2 296.33   2. Generalized anxiety disorder  F41.1 300.02   3. Insomnia, unspecified type  G47.00 780.52       PLAN:  1. Safety: No acute safety concerns at this time.  2. Therapy: Will refer for [facility]otherapy to [facility]  3. Risk Assessment: Risk of self-harm acutely is moderate.  Risk factors include anxiety disorder, mood disorder, present SI (passive, no plan or intent) and recent psychosocial stressors (pandemic). Protective factors include no family history, denies access to guns/weapons, no history of suicide attempts or self-harm in the past, minimal AODA, healthcare seeking, future orientation, willingness to engage in care.  Risk of self-harm chronically is also moderate, but could be further elevated in the event of treatment noncompliance and/or AODA.  4. Labs/Diagnostics Ordered:   Orders Placed This Encounter   Procedures   • Ambulatory Referral to Psychotherapy      5. Medications:   New Medications Ordered This Visit   Medications   • FLUoxetine (PROzac) 10 MG capsule     Sig: Take 1 cap PO QD. Take with 20mg cap for total dose of 30mg     Dispense:  90 capsule     Refill:  0   • FLUoxetine (PROzac) 20 MG capsule     Sig: Take 1 cap PO QD. Take with 10mg cap for total dose of 30mg     Dispense:  90 capsule     Refill:  0       Discussed all risks, benefits, alternatives, and side effects of Fluoxetine including but not limited to GI upset, decreased appetite, sexual dysfunction, bleeding risk, seizure risk, insomnia, anxiety, drowsiness, headache, asthenia, tremor, nervousness, activation of more or hypomania, increased fragility fracture risk, hyponatremia, ocular effects, withdrawal syndrome following abrupt discontinuation, serotonin syndrome, hypersensitivity reaction, and activation of suicidal ideation and behavior.  Pt educated on the need to practice safe sex while taking this med. Discussed the need for pt to immediately call the office for any new or worsening symptoms, such as worsening depression; feeling nervous or restless; suicidal thoughts or actions; or other changes changes in mood or behavior, and all other concerns. Pt educated on med compliance and the risks of suddenly stopping this medication or missing doses. Pt verbalized understanding and is agreeable to taking Fluoxetine. Addressed all questions and concerns.     6. Follow up:   F/u in 1 month.    TREATMENT PLAN/GOALS: Continue supportive psychotherapy efforts and medications as indicated. Treatment and medication options discussed during today's visit. Patient ackowledged and verbally consented to continue with current treatment plan and was educated on the importance of compliance with treatment and follow-up appointments.    MEDICATION ISSUES:  VANESSA reviewed as expected.  Discussed medication options and treatment plan of prescribed medication as well as the risks, benefits, and side  effects including potential falls, possible impaired driving and metabolic adversities among others. Patient is agreeable to call the office with any worsening of symptoms or onset of side effects. Patient is agreeable to call 911 or go to the nearest ER should he/she begin having SI/HI. No medication side effects or related complaints today.            This document has been electronically signed by Janet Wallace PA-C  February 10, 2023 09:49 EST      Part of this note may be an electronic transcription/translation of spoken language to printed text using the Dragon Dictation System.

## 2023-03-16 ENCOUNTER — TELEPHONE (OUTPATIENT)
Dept: BEHAVIORAL HEALTH | Facility: CLINIC | Age: 23
End: 2023-03-16
Payer: COMMERCIAL

## 2023-03-16 ENCOUNTER — OFFICE VISIT (OUTPATIENT)
Dept: BEHAVIORAL HEALTH | Facility: CLINIC | Age: 23
End: 2023-03-16
Payer: COMMERCIAL

## 2023-03-16 VITALS
WEIGHT: 133.6 LBS | SYSTOLIC BLOOD PRESSURE: 99 MMHG | HEIGHT: 68 IN | HEART RATE: 79 BPM | DIASTOLIC BLOOD PRESSURE: 66 MMHG | BODY MASS INDEX: 20.25 KG/M2

## 2023-03-16 DIAGNOSIS — F33.2 SEVERE EPISODE OF RECURRENT MAJOR DEPRESSIVE DISORDER, WITHOUT PSYCHOTIC FEATURES: Primary | ICD-10-CM

## 2023-03-16 DIAGNOSIS — G47.00 INSOMNIA, UNSPECIFIED TYPE: ICD-10-CM

## 2023-03-16 DIAGNOSIS — F41.1 GENERALIZED ANXIETY DISORDER: ICD-10-CM

## 2023-03-16 PROCEDURE — 99214 OFFICE O/P EST MOD 30 MIN: CPT | Performed by: PHYSICIAN ASSISTANT

## 2023-03-16 RX ORDER — FLUOXETINE HYDROCHLORIDE 40 MG/1
40 CAPSULE ORAL DAILY
Qty: 90 CAPSULE | Refills: 0 | Status: SHIPPED | OUTPATIENT
Start: 2023-03-16 | End: 2023-06-14

## 2023-03-16 NOTE — PROGRESS NOTES
Chief Complaint:  Depression, anxiety     History of Present Illness: Juliet Pittman is a 23 y.o. female who presents today for f/u of mood. Pt reports recently being diagnosed with PCOS and is getting hormones balanced. Pt reports depression has been a little better, rates it a 6.5. Pt denies having any SI or HI. She continues to have difficulty falling and staying asleep. Pt continues to have anxiety, no change. She has had some improvement in feeling on edge and being easily irritable. Pt admits to doing a lot of counting during her night routine.  She has not followed up for neuropsych testing.  Patient called for therapy but was able to find a therapist.    Medical Record Review: Reviewed office visit note from 1/23/23, Patient was initially started on Vilazodone for depression and anxiety. She had previously tried several other medications but felt that they were not effective. This medication caused her to breakout in hives. She has since stopped taking it and began taking Prozac that was previously prescribed to her by different provider. She would like to continue taking this medication. She has recently started a new job and is trying to go to the gym as well to help with her mental health. She has an upcoming appointment with psychiatry on 2/10/2023.       PHQ-9 Depression Screening  Little interest or pleasure in doing things?     Feeling down, depressed, or hopeless?     Trouble falling or staying asleep, or sleeping too much?     Feeling tired or having little energy?     Poor appetite or overeating?     Feeling bad about yourself - or that you are a failure or have let yourself or your family down?     Trouble concentrating on things, such as reading the newspaper or watching television?     Moving or speaking so slowly that other people could have noticed? Or the opposite - being so fidgety or restless that you have been moving around a lot more than usual?     Thoughts that you would be better off  dead, or of hurting yourself in some way?     PHQ-9 Total Score     If you checked off any problems, how difficult have these problems made it for you to do your work, take care of things at home, or get along with other people?           GOLD-7         ROS:  Review of Systems   Constitutional: Positive for fatigue. Negative for appetite change, diaphoresis and unexpected weight change.   HENT: Negative for drooling, tinnitus and trouble swallowing.    Eyes: Negative for visual disturbance.   Respiratory: Negative for cough, chest tightness and shortness of breath.    Cardiovascular: Negative for chest pain and palpitations.   Gastrointestinal: Negative for abdominal pain, constipation, diarrhea, nausea and vomiting.   Endocrine: Negative for cold intolerance and heat intolerance.   Genitourinary: Negative for difficulty urinating.   Musculoskeletal: Negative for arthralgias and myalgias.   Skin: Negative for rash.   Allergic/Immunologic: Negative for immunocompromised state.   Neurological: Negative for dizziness, tremors, seizures and headaches.   Psychiatric/Behavioral: Positive for agitation, dysphoric mood and sleep disturbance. Negative for hallucinations, self-injury and suicidal ideas. The patient is nervous/anxious.        Problem List:  Patient Active Problem List   Diagnosis   • Generalized anxiety disorder       Current Medications:   Current Outpatient Medications   Medication Sig Dispense Refill   • Cyanocobalamin (B-12 PO) Take  by mouth.     • Iron-Vitamin C (IRON 100/C PO) Take  by mouth.     • melatonin 1 MG tablet Take  by mouth.     • Probiotic Product (PROBIOTIC-10 PO) Take  by mouth.     • FLUoxetine (PROzac) 40 MG capsule Take 1 capsule by mouth Daily for 90 days. 90 capsule 0     No current facility-administered medications for this visit.       Discontinued Medications:  Medications Discontinued During This Encounter   Medication Reason   • FLUoxetine (PROzac) 10 MG capsule    • FLUoxetine  (PROzac) 20 MG capsule        Allergy:   Allergies   Allergen Reactions   • Amoxicillin Unknown - Low Severity   • Vilazodone Hives   • Vilazodone Hcl Hives   • Red Dye Rash        Past Medical History:  Past Medical History:   Diagnosis Date   • Anxiety    • Depression    • PCOS (polycystic ovarian syndrome)        Past Surgical History:  History reviewed. No pertinent surgical history.    Past Psychiatric History:  Began Treatment: 16 yr/o started therapy   Diagnoses: Depression, anxiety   Psychiatrist: Denies  Therapist: Pt has been in and out of therapy.   Admission History: Denies  Medications/Treatment: Lexapro (did well at first), Zoloft (decreased appetite), viibryd (hives), prozac  Self Harm: Denies  Suicide Attempts: Denies  Postpartum depression: N/A    Family Psychiatric History:   Diagnoses: Her grandmother has a h/o autism and her father has a h/o depression.   Substance use: Denies  Suicide Attempts/Completions: Denies    Family History   Problem Relation Age of Onset   • Diabetes Father    • Depression Father        Substance Abuse History:   Alcohol use: Rare  Nicotine: Denies  Illicit Drug Use: Denies  Longest Period Sober: Denies  Rehab/AA/NA: Denies    Social History:  Living Situation: Pt lives with her mother.  Marital/Relationship History: Single  Children: Denies  Work History/Occupation: Pt works at the [employer].   Education: Pt completed high school, some college.    History: Denies  Legal: Denies    Social History     Socioeconomic History   • Marital status: Single   Tobacco Use   • Smoking status: Never     Passive exposure: Never   • Smokeless tobacco: Never   Vaping Use   • Vaping Use: Never used   Substance and Sexual Activity   • Alcohol use: Never   • Drug use: Never   • Sexual activity: Not Currently     Partners: Male       Developmental History:   Place of birth: Pt was born in [place].   Siblings: Denies  Childhood: Pt reports mother was a single mom and was very broke  "growing up.       Physical Exam:  Physical Exam    Appearance: Well-groomed with adequate hygiene, appears to be of stated age. Casually and neatly dressed, maintains good eye contact.   Behavior: Appropriate, cooperative. No acute distress.  Motor: No abnormal movements, tics or tremors are noted. No psychomotor agitation or retardation.  Speech: Coherent, spontaneous, appropriate with normal rate, volume, rhythm, and tone. Normal reaction time to questions. No hyperverbal or pressured speech.   Mood: \"I'm okay\"  Affect: Patient appears slightly anxious  Thought content: Negative suicidal ideations, negative homicidal ideations. Patient denies any obsession, compulsion, or phobia. No evidence of delusions.  Perceptions: Negative auditory hallucinations, negative visual hallucinations. Pt does not appear to be actively responding to internal stimuli.   Thought process: Logical, goal-directed, coherent, and linear with no evidence of flight of ideas, looseness of associations, thought blocking, circumstantiality, or tangentiality.   Insight/Judgement: Poor/fair  Cognition: Alert and oriented to person, place, and date. Memory intact for recent and remote events. Attention and concentration intact.     Vital Signs:   BP 99/66   Pulse 79   Ht 172.7 cm (68\")   Wt 60.6 kg (133 lb 9.6 oz)   BMI 20.31 kg/m²      Lab Results:   Office Visit on 01/09/2023   Component Date Value Ref Range Status   • Rapid Strep A Screen 01/09/2023 Negative  Negative, VALID, INVALID, Not Performed Final   • Internal Control 01/09/2023 Passed  Passed Final   • Lot Number 01/09/2023 [id number]   Final   • Expiration Date 01/09/2023 [id number]   Final   • Throat Culture, Beta Strep 01/09/2023 No Beta Hemolytic Streptococcus Isolated   Final   • Mono Qual W/Rflx Qn 01/09/2023 Negative  Negative Final    The sensitivity of Heterophile antibody testing is 80-90%.  Lindsey Barr IgM testing offers higher sensitivity.   • Glucose 01/09/2023 94  65 - 99 " mg/dL Final   • BUN 01/09/2023 9  6 - 20 mg/dL Final   • Creatinine 01/09/2023 0.67  0.57 - 1.00 mg/dL Final   • Sodium 01/09/2023 142  136 - 145 mmol/L Final   • Potassium 01/09/2023 3.9  3.5 - 5.2 mmol/L Final   • Chloride 01/09/2023 105  98 - 107 mmol/L Final   • CO2 01/09/2023 26.9  22.0 - 29.0 mmol/L Final   • Calcium 01/09/2023 9.5  8.6 - 10.5 mg/dL Final   • BUN/Creatinine Ratio 01/09/2023 13.4  7.0 - 25.0 Final   • Anion Gap 01/09/2023 10.1  5.0 - 15.0 mmol/L Final   • eGFR 01/09/2023 127.7  >60.0 mL/min/1.73 Final    National Kidney Foundation and American Society of Nephrology (ASN) Task Force recommended calculation based on the Chronic Kidney Disease Epidemiology Collaboration (CKD-EPI) equation refit without adjustment for race.   • WBC 01/09/2023 6.33  3.40 - 10.80 10*3/mm3 Final   • RBC 01/09/2023 4.25  3.77 - 5.28 10*6/mm3 Final   • Hemoglobin 01/09/2023 13.5  12.0 - 15.9 g/dL Final   • Hematocrit 01/09/2023 39.0  34.0 - 46.6 % Final   • MCV 01/09/2023 91.8  79.0 - 97.0 fL Final   • MCH 01/09/2023 31.8  26.6 - 33.0 pg Final   • MCHC 01/09/2023 34.6  31.5 - 35.7 g/dL Final   • RDW 01/09/2023 12.1 (L)  12.3 - 15.4 % Final   • RDW-SD 01/09/2023 40.5  37.0 - 54.0 fl Final   • MPV 01/09/2023 10.3  6.0 - 12.0 fL Final   • Platelets 01/09/2023 267  140 - 450 10*3/mm3 Final   • Neutrophil % 01/09/2023 62.5  42.7 - 76.0 % Final   • Lymphocyte % 01/09/2023 26.5  19.6 - 45.3 % Final   • Monocyte % 01/09/2023 8.8  5.0 - 12.0 % Final   • Eosinophil % 01/09/2023 1.7  0.3 - 6.2 % Final   • Basophil % 01/09/2023 0.2  0.0 - 1.5 % Final   • Immature Grans % 01/09/2023 0.3  0.0 - 0.5 % Final   • Neutrophils, Absolute 01/09/2023 3.95  1.70 - 7.00 10*3/mm3 Final   • Lymphocytes, Absolute 01/09/2023 1.68  0.70 - 3.10 10*3/mm3 Final   • Monocytes, Absolute 01/09/2023 0.56  0.10 - 0.90 10*3/mm3 Final   • Eosinophils, Absolute 01/09/2023 0.11  0.00 - 0.40 10*3/mm3 Final   • Basophils, Absolute 01/09/2023 0.01  0.00 - 0.20  10*3/mm3 Final   • Immature Grans, Absolute 01/09/2023 0.02  0.00 - 0.05 10*3/mm3 Final   • nRBC 01/09/2023 0.0  0.0 - 0.2 /100 WBC Final       EKG Results:  No orders to display       Imaging Results:  No Images in the past 120 days found..      Assessment & Plan   Diagnoses and all orders for this visit:    1. Severe episode of recurrent major depressive disorder, without psychotic features (HCC) (Primary)  -     FLUoxetine (PROzac) 40 MG capsule; Take 1 capsule by mouth Daily for 90 days.  Dispense: 90 capsule; Refill: 0    2. Generalized anxiety disorder  -     FLUoxetine (PROzac) 40 MG capsule; Take 1 capsule by mouth Daily for 90 days.  Dispense: 90 capsule; Refill: 0    3. Insomnia, unspecified type    Presentation seems most consistent with MDD, GOLD, insomnia.  We will increase Prozac for management depression, anxiety, and overall mood.  Discussed medication options such as trazodone for sleep, which patient declines at this time.  Recommended over-the-counter magnesium for sleep.  Reiterated need for psychotherapy.  Recommended peaceful solutions.  Follow-up in 1 month.  Addressed all questions and concerns.    Visit Diagnoses:    ICD-10-CM ICD-9-CM   1. Severe episode of recurrent major depressive disorder, without psychotic features (HCC)  F33.2 296.33   2. Generalized anxiety disorder  F41.1 300.02   3. Insomnia, unspecified type  G47.00 780.52       PLAN:  1. Safety: No acute safety concerns at this time.  2. Therapy: Will refer for psychotherapy to next steps  3. Risk Assessment: Risk of self-harm acutely is moderate.  Risk factors include anxiety disorder, mood disorder, present SI (passive, no plan or intent) and recent psychosocial stressors (pandemic). Protective factors include no family history, denies access to guns/weapons, no history of suicide attempts or self-harm in the past, minimal AODA, healthcare seeking, future orientation, willingness to engage in care.  Risk of self-harm chronically  is also moderate, but could be further elevated in the event of treatment noncompliance and/or AODA.  4. Labs/Diagnostics Ordered:   No orders of the defined types were placed in this encounter.    5. Medications:   New Medications Ordered This Visit   Medications   • FLUoxetine (PROzac) 40 MG capsule     Sig: Take 1 capsule by mouth Daily for 90 days.     Dispense:  90 capsule     Refill:  0       Discussed all risks, benefits, alternatives, and side effects of Fluoxetine including but not limited to GI upset, decreased appetite, sexual dysfunction, bleeding risk, seizure risk, insomnia, anxiety, drowsiness, headache, asthenia, tremor, nervousness, activation of more or hypomania, increased fragility fracture risk, hyponatremia, ocular effects, withdrawal syndrome following abrupt discontinuation, serotonin syndrome, hypersensitivity reaction, and activation of suicidal ideation and behavior.  Pt educated on the need to practice safe sex while taking this med. Discussed the need for pt to immediately call the office for any new or worsening symptoms, such as worsening depression; feeling nervous or restless; suicidal thoughts or actions; or other changes changes in mood or behavior, and all other concerns. Pt educated on med compliance and the risks of suddenly stopping this medication or missing doses. Pt verbalized understanding and is agreeable to taking Fluoxetine. Addressed all questions and concerns.     6. Follow up:   F/u in 1 month.    TREATMENT PLAN/GOALS: Continue supportive psychotherapy efforts and medications as indicated. Treatment and medication options discussed during today's visit. Patient ackowledged and verbally consented to continue with current treatment plan and was educated on the importance of compliance with treatment and follow-up appointments.    MEDICATION ISSUES:  VANESSA reviewed as expected.  Discussed medication options and treatment plan of prescribed medication as well as the  risks, benefits, and side effects including potential falls, possible impaired driving and metabolic adversities among others. Patient is agreeable to call the office with any worsening of symptoms or onset of side effects. Patient is agreeable to call 911 or go to the nearest ER should he/she begin having SI/HI. No medication side effects or related complaints today.            This document has been electronically signed by Janet Wallace PA-C  March 16, 2023 11:20 EDT      Part of this note may be an electronic transcription/translation of spoken language to printed text using the Dragon Dictation System.

## 2023-03-16 NOTE — TELEPHONE ENCOUNTER
Tried to reach pt to get her follow up apt scheduled with Janet.  Was unable to get a hold of pt or LVM. Will try again before the end of the day.

## 2023-03-18 DIAGNOSIS — F33.2 SEVERE EPISODE OF RECURRENT MAJOR DEPRESSIVE DISORDER, WITHOUT PSYCHOTIC FEATURES: ICD-10-CM

## 2023-03-18 DIAGNOSIS — F41.1 GENERALIZED ANXIETY DISORDER: ICD-10-CM

## 2023-03-20 RX ORDER — VILAZODONE HYDROCHLORIDE 20 MG/1
20 TABLET ORAL DAILY
Qty: 30 TABLET | Refills: 2 | OUTPATIENT
Start: 2023-03-20

## 2023-03-24 ENCOUNTER — OFFICE VISIT (OUTPATIENT)
Dept: OTOLARYNGOLOGY | Facility: CLINIC | Age: 23
End: 2023-03-24
Payer: COMMERCIAL

## 2023-03-24 VITALS
SYSTOLIC BLOOD PRESSURE: 102 MMHG | BODY MASS INDEX: 21.16 KG/M2 | DIASTOLIC BLOOD PRESSURE: 72 MMHG | TEMPERATURE: 98.1 F | HEIGHT: 68 IN | WEIGHT: 139.6 LBS | HEART RATE: 74 BPM

## 2023-03-24 DIAGNOSIS — J31.0 CHRONIC RHINITIS: Primary | ICD-10-CM

## 2023-03-24 DIAGNOSIS — J34.3 HYPERTROPHY OF BOTH INFERIOR NASAL TURBINATES: ICD-10-CM

## 2023-03-24 DIAGNOSIS — J35.01 CHRONIC TONSILLITIS: ICD-10-CM

## 2023-03-24 PROCEDURE — 99203 OFFICE O/P NEW LOW 30 MIN: CPT | Performed by: OTOLARYNGOLOGY

## 2023-03-24 RX ORDER — FLUTICASONE PROPIONATE 50 MCG
2 SPRAY, SUSPENSION (ML) NASAL DAILY
Qty: 16 G | Refills: 11 | Status: SHIPPED | OUTPATIENT
Start: 2023-03-24

## 2023-03-24 NOTE — PROGRESS NOTES
Patient Name: Juliet Pittman   Visit Date: 03/24/2023   Patient ID: [id number]  Provider: Ruben Tamayo MD    Sex: female  [place] [facility]   YOB: 2000  Location Address: [address] [place] [address], [address] [place] [address],?[phone]?[id number]    Primary Care Provider Rocío Geronimo APRN  Location Phone: [phone]    Referring Provider: Rocío Geronimo, *        Chief Complaint  Enlarged tonsils x 3 months    History of Present Illness  Juliet Pittman is a 23 y.o. female who presents to [facility] today as a consult from Rocío Geronimo, * for evaluation of her tonsils.  She tells me that sometime around the end of October she began experiencing severe bilateral sore throat and rhinorrhea.  This was eventually diagnosed as streptococcal tonsillitis and she was treated with antibiotics.  However, her sore throat and tonsillar enlargement persisted until sometime in late January.  She tells me her throat no longer hurts but her tonsils seem to be enlarged.  They are not causing her any issues with dysphagia or snoring.  She does report occasional tonsil stones.  She also has trouble with frequent nasal congestion and rhinorrhea but denies any hyposmia or epistaxis.  She denies any prior nasal trauma or surgery.  She does not have any trouble with reflux.  She is not currently taking any medications for her nose. Group A strep screen and Monospot on 1/9/2023 was negative    Past Medical History:   Diagnosis Date   • Anxiety    • Depression    • PCOS (polycystic ovarian syndrome)        History reviewed. No pertinent surgical history.      Current Outpatient Medications:   •  Cyanocobalamin (B-12 PO), Take  by mouth., Disp: , Rfl:   •  FLUoxetine (PROzac) 40 MG capsule, Take 1 capsule by mouth Daily for 90 days., Disp: 90 capsule, Rfl: 0  •  Iron-Vitamin C (IRON 100/C PO), Take  by mouth., Disp: , Rfl:   •  melatonin 1 MG tablet, Take  by  "mouth., Disp: , Rfl:   •  Probiotic Product (PROBIOTIC-10 PO), Take  by mouth., Disp: , Rfl:   •  fluticasone (FLONASE) 50 MCG/ACT nasal spray, 2 sprays into the nostril(s) as directed by provider Daily., Disp: 16 g, Rfl: 11     Allergies   Allergen Reactions   • Amoxicillin Unknown - Low Severity   • Vilazodone Hives   • Vilazodone Hcl Hives   • Red Dye Rash       Social History     Tobacco Use   • Smoking status: Never     Passive exposure: Never   • Smokeless tobacco: Never   Vaping Use   • Vaping Use: Never used   Substance Use Topics   • Alcohol use: Never   • Drug use: Never        Objective     Vital Signs:   /72   Pulse 74   Temp 98.1 °F (36.7 °C)   Ht 172.7 cm (68\")   Wt 63.3 kg (139 lb 9.6 oz)   BMI 21.23 kg/m²       Physical Exam    General: Well developed, well nourished patient of stated age in no acute distress. Voice is strong and clear.   Head: Normocephalic and atraumatic.  Face: No lesions.  Bilateral parotid and submandibular glands are unremarkable.  Stensen's and Warthin's ducts are productive of clear saliva bilaterally.  House-Brackmann I/VI     bilaterally.   muscles and temporomandibular joint nontender to palpation.  No TMJ crepitus.  Eyes: PERRLA, sclerae anicteric, no conjunctival injection. Extraocular movements are intact and full. No nystagmus.   Ears: Auricles are normal in appearance. Bilateral external auditory canals are unremarkable. Bilateral tympanic membranes are clear and without effusion. Hearing normal to conversational voice.   Nose: External nose is normal in appearance. Bilateral nares are patent with normal appearing mucosa. Septum midline.  Bilateral inferior turbinate is hypertrophied worse on the left than the right. No lesions.   Oral Cavity: Lips are normal in appearance. Oral mucosa is unremarkable. Gingiva is unremarkable. Normal dentition for age. Tongue is unremarkable with good movement. Hard palate is unremarkable.   Oropharynx: Soft " palate is unremarkable with full movement. Uvula is unremarkable. Bilateral tonsils are 2+ and cryptic. Posterior oropharynx is unremarkable.    Larynx and hypopharynx: Deferred secondary to gag reflex.  Neck: Supple.  No mass.  Nontender to palpation.  Trachea midline. Thyroid normal size and without nodules to palpation.   Lymphatic: No lymphadenopathy upon palpation.  Respiratory: Clear to auscultation bilaterally, nonlabored respirations    Cardiovascular: RRR, no murmurs, rubs, or gallops,   Psychiatric: Appropriate affect, cooperative   Neurologic: Oriented x 3, strength symmetric in all extremities, Cranial Nerves II-XII are grossly intact to confrontation   Skin: Warm and dry. No rashes.    Procedures           Result Review :               Assessment and Plan    Diagnoses and all orders for this visit:    1. Chronic rhinitis (Primary)  -     fluticasone (FLONASE) 50 MCG/ACT nasal spray; 2 sprays into the nostril(s) as directed by provider Daily.  Dispense: 16 g; Refill: 11    2. Chronic tonsillitis    3. Hypertrophy of both inferior nasal turbinates      Impressions and findings were discussed at great length.  Currently, she is seen for evaluation after experiencing approximately 3 months of constant sore throat which began a streptococcal tonsillitis.  She also reports some rhinorrhea and nasal congestion.  Examination today reveals 2+ cryptic tonsils bilaterally.  She also has evidence of left greater than right inferior turbinate hypertrophy.  We discussed the pathophysiology and natural history of her conditions.  We discussed the differential for her prolonged episode of tonsillitis which has since resolved.  Less her tonsils do not seem to be actively causing her any problems I would recommend avoiding tonsillectomy.  In regards to her inferior turbinate hypertrophy and chronic rhinitis she will be tried on fluticasone and we discussed saline irrigations.  She understands that there are surgical  options for management if this is not helpful.  She was given ample time to ask questions, all of which were answered to her satisfaction.      Follow Up   No follow-ups on file.  Patient was given instructions and counseling regarding her condition or for health maintenance advice. Please see specific information pulled into the AVS if appropriate.

## 2023-04-29 DIAGNOSIS — F41.1 GENERALIZED ANXIETY DISORDER: ICD-10-CM

## 2023-04-29 DIAGNOSIS — F33.2 SEVERE EPISODE OF RECURRENT MAJOR DEPRESSIVE DISORDER, WITHOUT PSYCHOTIC FEATURES: ICD-10-CM

## 2023-05-01 RX ORDER — FLUOXETINE HYDROCHLORIDE 20 MG/1
20 CAPSULE ORAL DAILY
Qty: 30 CAPSULE | Refills: 2 | OUTPATIENT
Start: 2023-05-01

## 2023-08-10 ENCOUNTER — OFFICE VISIT (OUTPATIENT)
Dept: BEHAVIORAL HEALTH | Facility: CLINIC | Age: 23
End: 2023-08-10
Payer: COMMERCIAL

## 2023-08-10 VITALS
WEIGHT: 136.6 LBS | DIASTOLIC BLOOD PRESSURE: 82 MMHG | HEIGHT: 68 IN | SYSTOLIC BLOOD PRESSURE: 128 MMHG | BODY MASS INDEX: 20.7 KG/M2

## 2023-08-10 DIAGNOSIS — F41.1 GENERALIZED ANXIETY DISORDER: ICD-10-CM

## 2023-08-10 DIAGNOSIS — F99 INSOMNIA DUE TO OTHER MENTAL DISORDER: ICD-10-CM

## 2023-08-10 DIAGNOSIS — F51.05 INSOMNIA DUE TO OTHER MENTAL DISORDER: ICD-10-CM

## 2023-08-10 DIAGNOSIS — F33.2 SEVERE EPISODE OF RECURRENT MAJOR DEPRESSIVE DISORDER, WITHOUT PSYCHOTIC FEATURES: Primary | ICD-10-CM

## 2023-08-10 RX ORDER — BUPROPION HYDROCHLORIDE 100 MG/1
TABLET ORAL
Qty: 60 TABLET | Refills: 1 | Status: SHIPPED | OUTPATIENT
Start: 2023-08-10

## 2023-08-10 NOTE — PROGRESS NOTES
Chief Complaint:  Depression, anxiety     History of Present Illness: Juliet Pittman is a 23 y.o. female who presents today for f/u of mood. Pt did not tolerate prozac 40mg so she decreased back down to 20mg. Pt c/o depression that comes and goes, occurs a few days every few weeks, 4.5-6.5/10. Pt denies having any SI or HI. Pt c/o difficulty sleeping at night and will nap during the day. Pt reports sleep schedule is getting better. Pt c/o anxiety that is constant, rates it a 6/10. Pt has been going back to the gym. Pt denies irritability.     Medical Record Review: Reviewed office visit note from 1/23/23, Patient was initially started on Vilazodone for depression and anxiety. She had previously tried several other medications but felt that they were not effective. This medication caused her to breakout in hives. She has since stopped taking it and began taking Prozac that was previously prescribed to her by different provider. She would like to continue taking this medication. She has recently started a new job and is trying to go to the gym as well to help with her mental health. She has an upcoming appointment with psychiatry on 2/10/2023.       PHQ-9 Depression Screening  Little interest or pleasure in doing things?     Feeling down, depressed, or hopeless?     Trouble falling or staying asleep, or sleeping too much?     Feeling tired or having little energy?     Poor appetite or overeating?     Feeling bad about yourself - or that you are a failure or have let yourself or your family down?     Trouble concentrating on things, such as reading the newspaper or watching television?     Moving or speaking so slowly that other people could have noticed? Or the opposite - being so fidgety or restless that you have been moving around a lot more than usual?     Thoughts that you would be better off dead, or of hurting yourself in some way?     PHQ-9 Total Score     If you checked off any problems, how difficult have  these problems made it for you to do your work, take care of things at home, or get along with other people?           GOLD-7         ROS:  Review of Systems   Constitutional:  Positive for fatigue. Negative for appetite change, diaphoresis and unexpected weight change.   HENT:  Negative for drooling, tinnitus and trouble swallowing.    Eyes:  Negative for visual disturbance.   Respiratory:  Negative for cough, chest tightness and shortness of breath.    Cardiovascular:  Negative for chest pain and palpitations.   Gastrointestinal:  Negative for abdominal pain, constipation, diarrhea, nausea and vomiting.   Endocrine: Negative for cold intolerance and heat intolerance.   Genitourinary:  Negative for difficulty urinating.   Musculoskeletal:  Negative for arthralgias and myalgias.   Skin:  Negative for rash.   Allergic/Immunologic: Negative for immunocompromised state.   Neurological:  Negative for dizziness, tremors, seizures and headaches.   Psychiatric/Behavioral:  Positive for agitation, dysphoric mood and sleep disturbance. Negative for hallucinations, self-injury and suicidal ideas. The patient is nervous/anxious.      Problem List:  Patient Active Problem List   Diagnosis    Generalized anxiety disorder       Current Medications:   Current Outpatient Medications   Medication Sig Dispense Refill    Cyanocobalamin (B-12 PO) Take  by mouth.      Iron-Vitamin C (IRON 100/C PO) Take  by mouth.      melatonin 1 MG tablet Take  by mouth.      Probiotic Product (PROBIOTIC-10 PO) Take  by mouth.      buPROPion (WELLBUTRIN) 100 MG tablet Take 0.5 tab PO BID x 4 days, if tolerated can increase to 1 tab PO BID 60 tablet 1    fluticasone (FLONASE) 50 MCG/ACT nasal spray 2 sprays into the nostril(s) as directed by provider Daily. (Patient not taking: Reported on 8/10/2023) 16 g 11     No current facility-administered medications for this visit.       Discontinued Medications:  Medications Discontinued During This Encounter    Medication Reason    FLUoxetine (PROzac) 20 MG capsule        Allergy:   Allergies   Allergen Reactions    Amoxicillin Unknown - Low Severity    Vilazodone Hives    Vilazodone Hcl Hives    Red Dye Rash        Past Medical History:  Past Medical History:   Diagnosis Date    Anxiety     Depression     PCOS (polycystic ovarian syndrome)        Past Surgical History:  History reviewed. No pertinent surgical history.    Past Psychiatric History:  Began Treatment: 16 yr/o started therapy   Diagnoses: Depression, anxiety   Psychiatrist: Denies  Therapist: Pt has been in and out of therapy.   Admission History: Denies  Medications/Treatment: Lexapro (did well at first), Zoloft (decreased appetite), viibryd (hives), prozac  Self Harm: Denies  Suicide Attempts: Denies  Postpartum depression: N/A    Family Psychiatric History:   Diagnoses: Her grandmother has a h/o autism and her father has a h/o depression.   Substance use: Denies  Suicide Attempts/Completions: Denies    Family History   Problem Relation Age of Onset    Diabetes Father     Depression Father        Substance Abuse History:   Alcohol use: Rare  Nicotine: Denies  Illicit Drug Use: Denies  Longest Period Sober: Denies  Rehab/AA/NA: Denies    Social History:  Living Situation: Pt lives with her mother.  Marital/Relationship History: Single  Children: Denies  Work History/Occupation: Pt works at the [employer].   Education: Pt completed high school, some college.    History: Denies  Legal: Denies    Social History     Socioeconomic History    Marital status: Single   Tobacco Use    Smoking status: Never     Passive exposure: Never    Smokeless tobacco: Never   Vaping Use    Vaping Use: Never used   Substance and Sexual Activity    Alcohol use: Never    Drug use: Never    Sexual activity: Not Currently     Partners: Male       Developmental History:   Place of birth: Pt was born in [place].   Siblings: Denies  Childhood: Pt reports mother was a single mom  "and was very broke growing up.       Physical Exam:  Physical Exam    Appearance: Well-groomed with adequate hygiene, appears to be of stated age. Casually and neatly dressed, maintains good eye contact.   Behavior: Appropriate, cooperative. No acute distress.  Motor: No abnormal movements, tics or tremors are noted. No psychomotor agitation or retardation.  Speech: Coherent, spontaneous, appropriate with normal rate, volume, rhythm, and tone. Normal reaction time to questions. No hyperverbal or pressured speech.   Mood: \"I'm okay\"  Affect: Patient does not appear depressed or anxious  Thought content: Negative suicidal ideations, negative homicidal ideations. Patient denies any obsession, compulsion, or phobia. No evidence of delusions.  Perceptions: Negative auditory hallucinations, negative visual hallucinations. Pt does not appear to be actively responding to internal stimuli.   Thought process: Logical, goal-directed, coherent, and linear with no evidence of flight of ideas, looseness of associations, thought blocking, circumstantiality, or tangentiality.   Insight/Judgement: Poor/fair  Cognition: Alert and oriented to person, place, and date. Memory intact for recent and remote events. Attention and concentration intact.     Vital Signs:   /82   Ht 172.7 cm (67.99\")   Wt 62 kg (136 lb 9.6 oz)   BMI 20.77 kg/mý      Lab Results:   Office Visit on 01/09/2023   Component Date Value Ref Range Status    Rapid Strep A Screen 01/09/2023 Negative  Negative, VALID, INVALID, Not Performed Final    Internal Control 01/09/2023 Passed  Passed Final    Lot Number 01/09/2023 [id number]   Final    Expiration Date 01/09/2023 [id number]   Final    Throat Culture, Beta Strep 01/09/2023 No Beta Hemolytic Streptococcus Isolated   Final    Mono Qual W/Rflx Qn 01/09/2023 Negative  Negative Final    The sensitivity of Heterophile antibody testing is 80-90%.  Lindsey Barr IgM testing offers higher sensitivity.    Glucose " 01/09/2023 94  65 - 99 mg/dL Final    BUN 01/09/2023 9  6 - 20 mg/dL Final    Creatinine 01/09/2023 0.67  0.57 - 1.00 mg/dL Final    Sodium 01/09/2023 142  136 - 145 mmol/L Final    Potassium 01/09/2023 3.9  3.5 - 5.2 mmol/L Final    Chloride 01/09/2023 105  98 - 107 mmol/L Final    CO2 01/09/2023 26.9  22.0 - 29.0 mmol/L Final    Calcium 01/09/2023 9.5  8.6 - 10.5 mg/dL Final    BUN/Creatinine Ratio 01/09/2023 13.4  7.0 - 25.0 Final    Anion Gap 01/09/2023 10.1  5.0 - 15.0 mmol/L Final    eGFR 01/09/2023 127.7  >60.0 mL/min/1.73 Final    National Kidney Foundation and American Society of Nephrology (ASN) Task Force recommended calculation based on the Chronic Kidney Disease Epidemiology Collaboration (CKD-EPI) equation refit without adjustment for race.    WBC 01/09/2023 6.33  3.40 - 10.80 10*3/mm3 Final    RBC 01/09/2023 4.25  3.77 - 5.28 10*6/mm3 Final    Hemoglobin 01/09/2023 13.5  12.0 - 15.9 g/dL Final    Hematocrit 01/09/2023 39.0  34.0 - 46.6 % Final    MCV 01/09/2023 91.8  79.0 - 97.0 fL Final    MCH 01/09/2023 31.8  26.6 - 33.0 pg Final    MCHC 01/09/2023 34.6  31.5 - 35.7 g/dL Final    RDW 01/09/2023 12.1 (L)  12.3 - 15.4 % Final    RDW-SD 01/09/2023 40.5  37.0 - 54.0 fl Final    MPV 01/09/2023 10.3  6.0 - 12.0 fL Final    Platelets 01/09/2023 267  140 - 450 10*3/mm3 Final    Neutrophil % 01/09/2023 62.5  42.7 - 76.0 % Final    Lymphocyte % 01/09/2023 26.5  19.6 - 45.3 % Final    Monocyte % 01/09/2023 8.8  5.0 - 12.0 % Final    Eosinophil % 01/09/2023 1.7  0.3 - 6.2 % Final    Basophil % 01/09/2023 0.2  0.0 - 1.5 % Final    Immature Grans % 01/09/2023 0.3  0.0 - 0.5 % Final    Neutrophils, Absolute 01/09/2023 3.95  1.70 - 7.00 10*3/mm3 Final    Lymphocytes, Absolute 01/09/2023 1.68  0.70 - 3.10 10*3/mm3 Final    Monocytes, Absolute 01/09/2023 0.56  0.10 - 0.90 10*3/mm3 Final    Eosinophils, Absolute 01/09/2023 0.11  0.00 - 0.40 10*3/mm3 Final    Basophils, Absolute 01/09/2023 0.01  0.00 - 0.20 10*3/mm3  Final    Immature Grans, Absolute 01/09/2023 0.02  0.00 - 0.05 10*3/mm3 Final    nRBC 01/09/2023 0.0  0.0 - 0.2 /100 WBC Final       EKG Results:  No orders to display       Imaging Results:  No Images in the past 120 days found..      Assessment & Plan   Diagnoses and all orders for this visit:    1. Severe episode of recurrent major depressive disorder, without psychotic features (Primary)  -     Ambulatory Referral to Psychotherapy  -     buPROPion (WELLBUTRIN) 100 MG tablet; Take 0.5 tab PO BID x 4 days, if tolerated can increase to 1 tab PO BID  Dispense: 60 tablet; Refill: 1    2. Generalized anxiety disorder  -     Ambulatory Referral to Psychotherapy  -     buPROPion (WELLBUTRIN) 100 MG tablet; Take 0.5 tab PO BID x 4 days, if tolerated can increase to 1 tab PO BID  Dispense: 60 tablet; Refill: 1    3. Insomnia due to other mental disorder    Patient screened positive for depression based on a PHQ-9 score of 17 on 2/10/2023. Follow-up recommendations include: Prescribed antidepressant medication treatment, Suicide Risk Assessment performed, and see assessment below .    Presentation seems most consistent with MDD, GOLD, insomnia.  We will discontinue Prozac.  We will start on Wellbutrin for management of depression, anxiety, and overall mood.  Patient declines medication for sleep.  Will refer for therapy to Maritza Escamilla.  Follow-up in 1 month.  Addressed all questions and concerns.    Visit Diagnoses:    ICD-10-CM ICD-9-CM   1. Severe episode of recurrent major depressive disorder, without psychotic features  F33.2 296.33   2. Generalized anxiety disorder  F41.1 300.02   3. Insomnia due to other mental disorder  F51.05 300.9    F99 327.02       PLAN:  Safety: No acute safety concerns at this time.  Therapy: Will refer for psychotherapy to Maritza  Risk Assessment: Risk of self-harm acutely is moderate.  Risk factors include anxiety disorder, mood disorder, present SI (passive, no plan or intent) and recent  psychosocial stressors (pandemic). Protective factors include no family history, denies access to guns/weapons, no history of suicide attempts or self-harm in the past, minimal AODA, healthcare seeking, future orientation, willingness to engage in care.  Risk of self-harm chronically is also moderate, but could be further elevated in the event of treatment noncompliance and/or AODA.  Labs/Diagnostics Ordered:   Orders Placed This Encounter   Procedures    Ambulatory Referral to Psychotherapy     Medications:   New Medications Ordered This Visit   Medications    buPROPion (WELLBUTRIN) 100 MG tablet     Sig: Take 0.5 tab PO BID x 4 days, if tolerated can increase to 1 tab PO BID     Dispense:  60 tablet     Refill:  1       Discussed all risks, benefits, alternatives, and side effects of Bupropion including but not limited to GI upset (N/V/D, constipation), tachycardia, diaphoresis, weight loss, agitation, dizziness, headache, insomnia, tremor, blurred vision, anorexia, HTN, activation of more or hypomania, CNS stimulation and neuropsychiatric effects, ocular effects, seizure risk, withdrawal syndrome following abrupt discontinuation, and activation of suicidal ideation and behavior. Pt educated on the need to practice safe sex while taking this med. Discussed the need for pt to immediately call the office for any new or worsening symptoms, such as worsening depression; feeling nervous or restless; suicidal thoughts or actions; or other changes changes in mood or behavior, and all other concerns. Pt educated on med compliance. Pt verbalized understanding and is agreeable to taking Bupropion. Addressed all questions and concerns.     Follow up:   F/u in 1 month.    TREATMENT PLAN/GOALS: Continue supportive psychotherapy efforts and medications as indicated. Treatment and medication options discussed during today's visit. Patient ackowledged and verbally consented to continue with current treatment plan and was  educated on the importance of compliance with treatment and follow-up appointments.    MEDICATION ISSUES:  VANESSA reviewed as expected.  Discussed medication options and treatment plan of prescribed medication as well as the risks, benefits, and side effects including potential falls, possible impaired driving and metabolic adversities among others. Patient is agreeable to call the office with any worsening of symptoms or onset of side effects. Patient is agreeable to call 911 or go to the nearest ER should he/she begin having SI/HI. No medication side effects or related complaints today.            This document has been electronically signed by Janet Wallace PA-C  August 10, 2023 16:44 EDT      Part of this note may be an electronic transcription/translation of spoken language to printed text using the Dragon Dictation System.

## 2023-08-13 DIAGNOSIS — F41.1 GENERALIZED ANXIETY DISORDER: ICD-10-CM

## 2023-08-13 DIAGNOSIS — F33.2 SEVERE EPISODE OF RECURRENT MAJOR DEPRESSIVE DISORDER, WITHOUT PSYCHOTIC FEATURES: ICD-10-CM

## 2023-08-14 RX ORDER — FLUOXETINE HYDROCHLORIDE 20 MG/1
CAPSULE ORAL
Qty: 30 CAPSULE | Refills: 0 | OUTPATIENT
Start: 2023-08-14

## 2023-08-14 NOTE — TELEPHONE ENCOUNTER
FLUOXETINE 20MG CAPSULES   The original prescription was discontinued on 8/10/2023 by Janet Wallace PA-C. Renewing this prescription may not be appropriate.   Last refill:6/30/2023    Follow up 09/18/2023

## 2023-08-15 ENCOUNTER — OFFICE VISIT (OUTPATIENT)
Dept: PSYCHIATRY | Facility: CLINIC | Age: 23
End: 2023-08-15
Payer: COMMERCIAL

## 2023-08-15 DIAGNOSIS — F33.9 RECURRENT MAJOR DEPRESSIVE DISORDER, REMISSION STATUS UNSPECIFIED: ICD-10-CM

## 2023-08-15 DIAGNOSIS — F41.1 GENERALIZED ANXIETY DISORDER: Primary | ICD-10-CM

## 2023-08-15 NOTE — PROGRESS NOTES
Date: August 24, 2023  Time In: 2:00pm   Time Out: 2:53pm       PROGRESS NOTE  Data:  Juliet Pittman is a 23 y.o. female who presents today for individual therapy session through [facility] with Maritza Escamilla LCSW.       Clinical Maneuvering/Intervention:  Assisted patient in processing  session content; acknowledged and normalized patient's thoughts, feelings, and concerns.  Rationalized patient thought process regarding depression and anxiety .  Discussed triggers associated with patient's mood .  Also discussed coping skills for patient to implement such as participation in therapy, self reflection, self care, leisure activities .    Allowed patient to freely discuss issues without interruption or judgment. Provided safe, confidential environment to facilitate the development of positive therapeutic relationship and encourage open, honest communication. Assisted patient in identifying risk factors which would indicate the need for higher level of care including thoughts to harm self or others and/or self-harming behavior and encouraged patient to contact this office, call 911, or present to the nearest emergency room should any of these events occur. Discussed crisis intervention services and means to access. Patient adamantly and convincingly denies current suicidal or homicidal ideation or perceptual disturbance.  Therapist provided emotional support and education this date.   Discussed the therapist/patient relationship and explain the parameters and limitations of relative confidentiality.  Also discussed the importance active participation, and honesty to the treatment process.  Encouraged patient to utilize individual sessions to discuss/vent their feelings, frustrations, and fears.      Assessment   Patient appears to maintain relative stability .  However, patient continues to struggle with Mood at times. Recent medication change feels like it is working. She has been  engaging in components of healthy lifestyle that have contributed to there sense of well being.   She feels that she is more stable now than she has ever been and recognizes aspects of care that have contributed  which continues to cause impairment in important areas of functioning.  She wants to maintain this progress and continue to explore personal growth as well as be sure to maintain the progress that she has made. She can be reasonably be expected to continue to benefit from treatment and would likely be at increased risk for decompensation otherwise.    Mental Status Exam:   Hygiene:   good  Cooperation:  Cooperative  Eye Contact:  Good  Psychomotor Behavior:  Appropriate  Affect:  Full range  Mood: normal  Speech:  Normal  Thought Process:  Goal directed  Thought Content:  Mood congruent  Suicidal:  None  Homicidal:  None  Hallucinations:  None  Delusion:  None  Memory:  Intact  Orientation:  Person, Place, Time, and Situation  Reliability:  good  Insight:  Fair  Judgement:  Fair  Impulse Control:  Fair  Physical/Medical Issues:  No        Patient's Support Network Includes:  mother and extended family    Functional Status: Mild impairment     Progress toward goal: Not at goal             Plan   Supportive psychotherapy for symptom management  Discuss further history next time.   Patient will continue in individual outpatient therapy with focus on improved functioning and coping skills, maintaining stability, and avoiding decompensation and the need for higher level of care.    Patient will adhere to medication regimen as prescribed and report any side effects. Patient will contact this office, call 911 or present to the nearest emergency room should suicidal or homicidal ideations occur. Provide Cognitive Behavioral Therapy and Solution Focused Therapy to improve functioning, maintain stability, and avoid decompensation and the need for higher level of care.     Return in about 2-3 weeks, or earlier if  symptoms worsen or fail to improve.           VISIT DIAGNOSIS:     ICD-10-CM ICD-9-CM   1. Generalized anxiety disorder  F41.1 300.02   2. Recurrent major depressive disorder, remission status unspecified  F33.9 296.30        This document has been electronically signed by Maritza Escamilla LCSW, August 24, 2023, 07:55 EDT    Part of this note may be an electronic transcription/translation of spoken language to printed text using the Dragon Dictation System.      Seasonal
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
## 2023-09-06 NOTE — PROGRESS NOTES
Progress Note    Date: 09/07/2023  Time In: 01:01  Time Out: 01:55     Patient Legal Name: Juliet Pittman  Patient Age: 23 y.o.    CHIEF COMPLAINT: Depression     Subjective   History of Present Illness       Ladonna returns today for ongoing psychotherapy.  Her initial session with this provider was on 08/24/23. At that time our goals were Supportive psychotherapy for symptom management  Discuss further history next time.   Patient will continue in individual outpatient therapy with focus on improved functioning and coping skills, maintaining stability, and avoiding decompensation and the need for higher level of care.    Assessment    Mental Status Exam     Appearance: good hygiene and dressed appropriately for the weather  Behavior: calm  Cooperation:  engaged, cooperative, attentive, and friendly  Eye Contact:  good  Affect:  congruent  Mood: expressive  Speech: responsive and talkative  Thought Process:  organized and goal-oriented  Thought Content: appropriate  Suicidal: denies  Homicidal:  denies  Hallucinations:  denies  Memory:  intact  Orientation:  person, place, time, and situation  Reliability:  reliable  Insight:  good  Judgment:  good     Clinical Intervention       ICD-10-CM ICD-9-CM   1. GOLD (generalized anxiety disorder)  F41.1 300.02   2. Recurrent major depressive disorder, remission status unspecified  F33.9 296.30          Juliet is a 23 y.o. female who presents today as a follow-up for continued psychotherapy. Individual psychotherapy was provided utilizing solution focused  techniques to restore adaptive functioning, provide symptom relief, identify goals for treatment, manage stress, identify triggers, recognize patterns of behavior, acknowledge sources of feelings and behaviors, provide support, and discuss interpersonal conflicts.  Juliet continues to have relationship challenges. She has difficulty with attachment. She feels that she overly attaches. This initially for years was with mom, now in  other relationships as well. She feels like she has no aspect of components that are just her, only pieces of all the other people in her life.  No concerns noted regarding other symptomology. Stable and compliant on medication at this time. Work and school are going well, difficulty accomplishing tasks without deadlines     Plan   Plan & Goals     Work toward establishing sense of self through activities of potential interest,  likely the gym and reading  Further discuss relationship challenges next session.     Patient acknowledged and verbally consented to continue working toward resolving current treatment plan goals and was educated on the importance of participation in the therapeutic process.  Patient will remain compliant with medication regimen as prescribed. Discuss any medication side effects, questions or concerns with prescribing provider.  Call 911 or present to the nearest emergency room in an emergency situation.  National Suicide Prevention Lifeline: Call 988. The Lifeline provides 24/7, free and confidential support for people in distress, prevention and crisis resources.  Crisis Text Line  Text HOME To 105978    Return in about 3 weeks (around 9/28/2023).    ____________________  This document has been electronically signed by Maritza Escamilla LCSW  September 7, 2023 14:30 EDT    Part of this note may be an electronic transcription/translation of spoken language to printed text using the Dragon Dictation System.

## 2023-09-07 ENCOUNTER — OFFICE VISIT (OUTPATIENT)
Dept: PSYCHIATRY | Facility: CLINIC | Age: 23
End: 2023-09-07
Payer: COMMERCIAL

## 2023-09-07 DIAGNOSIS — F41.1 GAD (GENERALIZED ANXIETY DISORDER): Primary | ICD-10-CM

## 2023-09-07 DIAGNOSIS — F33.9 RECURRENT MAJOR DEPRESSIVE DISORDER, REMISSION STATUS UNSPECIFIED: ICD-10-CM
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
## 2023-09-18 ENCOUNTER — TELEMEDICINE (OUTPATIENT)
Dept: BEHAVIORAL HEALTH | Facility: CLINIC | Age: 23
End: 2023-09-18
Payer: COMMERCIAL

## 2023-09-18 DIAGNOSIS — F33.42 RECURRENT MAJOR DEPRESSIVE DISORDER, IN FULL REMISSION: ICD-10-CM

## 2023-09-18 DIAGNOSIS — F41.1 GENERALIZED ANXIETY DISORDER: Primary | ICD-10-CM

## 2023-09-18 RX ORDER — BUPROPION HYDROCHLORIDE 100 MG/1
100 TABLET ORAL 2 TIMES DAILY
Qty: 180 TABLET | Refills: 0 | Status: SHIPPED | OUTPATIENT
Start: 2023-09-18 | End: 2023-12-17

## 2023-09-18 RX ORDER — BUPROPION HYDROCHLORIDE 100 MG/1
100 TABLET ORAL 2 TIMES DAILY
Qty: 180 TABLET | Refills: 0 | Status: SHIPPED | OUTPATIENT
Start: 2023-09-18 | End: 2023-09-18 | Stop reason: SDUPTHER

## 2023-09-18 NOTE — PROGRESS NOTES
This provider is located at [address], [address], [place] [address]. The Patient is seen remotely using [employer]. Patient is being seen via telehealth and confirm that they are in a secure environment for this session. The patient's condition being diagnosed/treated is appropriate for telemedicine. The provider identified herself as well as her credentials.   The patient gave consent to be seen remotely, and when consent is given they understand that the consent allows for patient identifiable information to be sent to a third party as needed.   They may refuse to be seen remotely at any time. The electronic data is encrypted and password protected, and the patient has been advised of the potential risks to privacy not withstanding such measures.    Patient is accepting of and agreeable to appointment.  The appointment consisted of the patient and I only.      Mode of visit: Video  [facility] [address], [place] [address].  Location of patient: Home  Does the patient consent to use a video/audio connection for your medical care today? Yes  The visit included audio and video interaction. No technical issues occurred during this visit.    Chief Complaint:  Depression, anxiety     History of Present Illness: Juliet Pittman is a 23 y.o. female who presents today for f/u of mood.  Patient has been taking meds as prescribed and tolerating them well without any complications.  Pt denies feeling depressed. No SI or HI. No difficulty sleeping.  She denies napping during the day.  Pt denies feeling anxious. No irritability or feeling on edge. Pt started school. Pt has been doing therapy with [facility].  Patient denies having any symptoms or complaints at this time.    Medical Record Review: Reviewed office visit note from 1/23/23, Patient was initially started on Vilazodone for depression and anxiety. She had previously tried several other medications but felt that they were  not effective. This medication caused her to breakout in hives. She has since stopped taking it and began taking Prozac that was previously prescribed to her by different provider. She would like to continue taking this medication. She has recently started a new job and is trying to go to the gym as well to help with her mental health. She has an upcoming appointment with psychiatry on 2/10/2023.       PHQ-9 Depression Screening  Little interest or pleasure in doing things? (P) 0-->not at all   Feeling down, depressed, or hopeless? (P) 1-->several days   Trouble falling or staying asleep, or sleeping too much? (P) 1-->several days   Feeling tired or having little energy? (P) 1-->several days   Poor appetite or overeating? (P) 0-->not at all   Feeling bad about yourself - or that you are a failure or have let yourself or your family down? (P) 0-->not at all   Trouble concentrating on things, such as reading the newspaper or watching television? (P) 0-->not at all   Moving or speaking so slowly that other people could have noticed? Or the opposite - being so fidgety or restless that you have been moving around a lot more than usual? (P) 0-->not at all   Thoughts that you would be better off dead, or of hurting yourself in some way? (P) 0-->not at all   PHQ-9 Total Score (P) 3   If you checked off any problems, how difficult have these problems made it for you to do your work, take care of things at home, or get along with other people? (P) not difficult at all         GOLD-7  Over the last 2 weeks, how often have you been bothered by any of the following problems?  Feeling nervous, anxious, or on edge: Not at all  Not being able to stop or control worrying: Not at all  Worrying too much about different things: Not at all  Trouble relaxing: Not at all  Being so restless that it is hard to sit still: Not at all  Becoming easily annoyed or irritable: Not at all  Feeling afraid as if something awful might happen: Not at  all  GOLD-7 Total Score: 0        ROS:  Review of Systems   Constitutional:  Positive for fatigue. Negative for appetite change, diaphoresis and unexpected weight change.   HENT:  Negative for drooling, tinnitus and trouble swallowing.    Eyes:  Negative for visual disturbance.   Respiratory:  Negative for cough, chest tightness and shortness of breath.    Cardiovascular:  Negative for chest pain and palpitations.   Gastrointestinal:  Negative for abdominal pain, constipation, diarrhea, nausea and vomiting.   Endocrine: Negative for cold intolerance and heat intolerance.   Genitourinary:  Negative for difficulty urinating.   Musculoskeletal:  Negative for arthralgias and myalgias.   Skin:  Negative for rash.   Allergic/Immunologic: Negative for immunocompromised state.   Neurological:  Negative for dizziness, tremors, seizures and headaches.   Psychiatric/Behavioral:  Negative for agitation, dysphoric mood, hallucinations, self-injury, sleep disturbance and suicidal ideas. The patient is not nervous/anxious.      Problem List:  Patient Active Problem List   Diagnosis    Generalized anxiety disorder       Current Medications:   Current Outpatient Medications   Medication Sig Dispense Refill    buPROPion (WELLBUTRIN) 100 MG tablet Take 1 tablet by mouth 2 (Two) Times a Day for 90 days. 180 tablet 0    Cyanocobalamin (B-12 PO) Take  by mouth.      fluticasone (FLONASE) 50 MCG/ACT nasal spray 2 sprays into the nostril(s) as directed by provider Daily. (Patient not taking: Reported on 8/10/2023) 16 g 11    Iron-Vitamin C (IRON 100/C PO) Take  by mouth.      melatonin 1 MG tablet Take  by mouth.      Probiotic Product (PROBIOTIC-10 PO) Take  by mouth.       No current facility-administered medications for this visit.       Discontinued Medications:  Medications Discontinued During This Encounter   Medication Reason    buPROPion (WELLBUTRIN) 100 MG tablet Reorder    buPROPion (WELLBUTRIN) 100 MG tablet Reorder        Allergy:   Allergies   Allergen Reactions    Amoxicillin Unknown - Low Severity    Vilazodone Hives    Vilazodone Hcl Hives    Red Dye Rash        Past Medical History:  Past Medical History:   Diagnosis Date    Anxiety     Depression     PCOS (polycystic ovarian syndrome)        Past Surgical History:  No past surgical history on file.    Past Psychiatric History:  Began Treatment: 16 yr/o started therapy   Diagnoses: Depression, anxiety   Psychiatrist: Denies  Therapist: Pt has been in and out of therapy.   Admission History: Denies  Medications/Treatment: Lexapro (did well at first), Zoloft (decreased appetite), viibryd (hives), prozac, wellbutrin  Self Harm: Denies  Suicide Attempts: Denies  Postpartum depression: N/A    Family Psychiatric History:   Diagnoses: Her grandmother has a h/o autism and her father has a h/o depression.   Substance use: Denies  Suicide Attempts/Completions: Denies    Family History   Problem Relation Age of Onset    Diabetes Father     Depression Father        Substance Abuse History:   Alcohol use: Rare  Nicotine: Denies  Illicit Drug Use: Denies  Longest Period Sober: Denies  Rehab/AA/NA: Denies    Social History:  Living Situation: Pt lives with her mother.  Marital/Relationship History: Single  Children: Denies  Work History/Occupation: Pt works at the [employer].   Education: Pt completed high school, some college.    History: Denies  Legal: Denies    Social History     Socioeconomic History    Marital status: Single   Tobacco Use    Smoking status: Never     Passive exposure: Never    Smokeless tobacco: Never   Vaping Use    Vaping Use: Never used   Substance and Sexual Activity    Alcohol use: Never    Drug use: Never    Sexual activity: Not Currently     Partners: Male       Developmental History:   Place of birth: Pt was born in [place].   Siblings: Denies  Childhood: Pt reports mother was a single mom and was very broke growing up.       Physical Exam:  Physical  "Exam    Appearance: appears to be of stated age, maintains good eye contact.   Behavior: Appropriate, cooperative. No acute distress.  Motor: No abnormal movements  Speech: Coherent, spontaneous, appropriate with normal rate, volume, rhythm, and tone. Normal reaction time to questions. No hyperverbal or pressured speech.   Mood: \"I'm good\"  Affect: Patient does not appear depressed or anxious  Thought content: Negative suicidal ideations, negative homicidal ideations. Patient denies any obsession, compulsion, or phobia. No evidence of delusions.  Perceptions: Negative auditory hallucinations, negative visual hallucinations. Pt does not appear to be actively responding to internal stimuli.   Thought process: Logical, goal-directed, coherent, and linear with no evidence of flight of ideas, looseness of associations, thought blocking, circumstantiality, or tangentiality.   Insight/Judgement: Poor/fair  Cognition: Alert and oriented to person, place, and date. Memory intact for recent and remote events. Attention and concentration intact.     Vital Signs:   There were no vitals taken for this visit.     Lab Results:   Office Visit on 01/09/2023   Component Date Value Ref Range Status    Rapid Strep A Screen 01/09/2023 Negative  Negative, VALID, INVALID, Not Performed Final    Internal Control 01/09/2023 Passed  Passed Final    Lot Number 01/09/2023 [id number]   Final    Expiration Date 01/09/2023 [id number]   Final    Throat Culture, Beta Strep 01/09/2023 No Beta Hemolytic Streptococcus Isolated   Final    Mono Qual W/Rflx Qn 01/09/2023 Negative  Negative Final    The sensitivity of Heterophile antibody testing is 80-90%.  Lindsey Barr IgM testing offers higher sensitivity.    Glucose 01/09/2023 94  65 - 99 mg/dL Final    BUN 01/09/2023 9  6 - 20 mg/dL Final    Creatinine 01/09/2023 0.67  0.57 - 1.00 mg/dL Final    Sodium 01/09/2023 142  136 - 145 mmol/L Final    Potassium 01/09/2023 3.9  3.5 - 5.2 mmol/L Final    Chloride " 01/09/2023 105  98 - 107 mmol/L Final    CO2 01/09/2023 26.9  22.0 - 29.0 mmol/L Final    Calcium 01/09/2023 9.5  8.6 - 10.5 mg/dL Final    BUN/Creatinine Ratio 01/09/2023 13.4  7.0 - 25.0 Final    Anion Gap 01/09/2023 10.1  5.0 - 15.0 mmol/L Final    eGFR 01/09/2023 127.7  >60.0 mL/min/1.73 Final    National Kidney Foundation and American Society of Nephrology (ASN) Task Force recommended calculation based on the Chronic Kidney Disease Epidemiology Collaboration (CKD-EPI) equation refit without adjustment for race.    WBC 01/09/2023 6.33  3.40 - 10.80 10*3/mm3 Final    RBC 01/09/2023 4.25  3.77 - 5.28 10*6/mm3 Final    Hemoglobin 01/09/2023 13.5  12.0 - 15.9 g/dL Final    Hematocrit 01/09/2023 39.0  34.0 - 46.6 % Final    MCV 01/09/2023 91.8  79.0 - 97.0 fL Final    MCH 01/09/2023 31.8  26.6 - 33.0 pg Final    MCHC 01/09/2023 34.6  31.5 - 35.7 g/dL Final    RDW 01/09/2023 12.1 (L)  12.3 - 15.4 % Final    RDW-SD 01/09/2023 40.5  37.0 - 54.0 fl Final    MPV 01/09/2023 10.3  6.0 - 12.0 fL Final    Platelets 01/09/2023 267  140 - 450 10*3/mm3 Final    Neutrophil % 01/09/2023 62.5  42.7 - 76.0 % Final    Lymphocyte % 01/09/2023 26.5  19.6 - 45.3 % Final    Monocyte % 01/09/2023 8.8  5.0 - 12.0 % Final    Eosinophil % 01/09/2023 1.7  0.3 - 6.2 % Final    Basophil % 01/09/2023 0.2  0.0 - 1.5 % Final    Immature Grans % 01/09/2023 0.3  0.0 - 0.5 % Final    Neutrophils, Absolute 01/09/2023 3.95  1.70 - 7.00 10*3/mm3 Final    Lymphocytes, Absolute 01/09/2023 1.68  0.70 - 3.10 10*3/mm3 Final    Monocytes, Absolute 01/09/2023 0.56  0.10 - 0.90 10*3/mm3 Final    Eosinophils, Absolute 01/09/2023 0.11  0.00 - 0.40 10*3/mm3 Final    Basophils, Absolute 01/09/2023 0.01  0.00 - 0.20 10*3/mm3 Final    Immature Grans, Absolute 01/09/2023 0.02  0.00 - 0.05 10*3/mm3 Final    nRBC 01/09/2023 0.0  0.0 - 0.2 /100 WBC Final       EKG Results:  No orders to display       Imaging Results:  No Images in the past 120 days  found..      Assessment & Plan   Diagnoses and all orders for this visit:    1. Generalized anxiety disorder (Primary)  -     Discontinue: buPROPion (WELLBUTRIN) 100 MG tablet; Take 1 tablet by mouth 2 (Two) Times a Day for 90 days.  Dispense: 180 tablet; Refill: 0  -     buPROPion (WELLBUTRIN) 100 MG tablet; Take 1 tablet by mouth 2 (Two) Times a Day for 90 days.  Dispense: 180 tablet; Refill: 0    2. Recurrent major depressive disorder, in full remission  -     Discontinue: buPROPion (WELLBUTRIN) 100 MG tablet; Take 1 tablet by mouth 2 (Two) Times a Day for 90 days.  Dispense: 180 tablet; Refill: 0  -     buPROPion (WELLBUTRIN) 100 MG tablet; Take 1 tablet by mouth 2 (Two) Times a Day for 90 days.  Dispense: 180 tablet; Refill: 0    Patient screened positive for depression based on a PHQ-9 score of 3 on 9/18/2023. Follow-up recommendations include: Prescribed antidepressant medication treatment, Suicide Risk Assessment performed, and see assessment below .    Presentation seems most consistent with MDD, GOLD, insomnia.  Will continue Wellbutrin for management of depression, anxiety, and overall mood.  Continue therapy.  Follow-up in 3 months.  Instructed patient to contact the office for any new or worsening symptoms or any other concerns.  Addressed all questions and concerns.    Visit Diagnoses:    ICD-10-CM ICD-9-CM   1. Generalized anxiety disorder  F41.1 300.02   2. Recurrent major depressive disorder, in full remission  F33.42 296.36       PLAN:  Safety: No acute safety concerns at this time.  Therapy: Will refer for psychotherapy to [facility]  Risk Assessment: Risk of self-harm acutely is moderate.  Risk factors include anxiety disorder, mood disorder, and recent psychosocial stressors (pandemic). Protective factors include no family history, denies access to guns/weapons, no history of suicide attempts or self-harm in the past, no present SI, minimal AODA, healthcare seeking, future orientation, willingness to  engage in care.  Risk of self-harm chronically is also moderate, but could be further elevated in the event of treatment noncompliance and/or AODA.  Labs/Diagnostics Ordered:   No orders of the defined types were placed in this encounter.    Medications:   New Medications Ordered This Visit   Medications    buPROPion (WELLBUTRIN) 100 MG tablet     Sig: Take 1 tablet by mouth 2 (Two) Times a Day for 90 days.     Dispense:  180 tablet     Refill:  0       Discussed all risks, benefits, alternatives, and side effects of Bupropion including but not limited to GI upset (N/V/D, constipation), tachycardia, diaphoresis, weight loss, agitation, dizziness, headache, insomnia, tremor, blurred vision, anorexia, HTN, activation of more or hypomania, CNS stimulation and neuropsychiatric effects, ocular effects, seizure risk, withdrawal syndrome following abrupt discontinuation, and activation of suicidal ideation and behavior. Pt educated on the need to practice safe sex while taking this med. Discussed the need for pt to immediately call the office for any new or worsening symptoms, such as worsening depression; feeling nervous or restless; suicidal thoughts or actions; or other changes changes in mood or behavior, and all other concerns. Pt educated on med compliance. Pt verbalized understanding and is agreeable to taking Bupropion. Addressed all questions and concerns.     Follow up:   F/u in 3 months.    TREATMENT PLAN/GOALS: Continue supportive psychotherapy efforts and medications as indicated. Treatment and medication options discussed during today's visit. Patient ackowledged and verbally consented to continue with current treatment plan and was educated on the importance of compliance with treatment and follow-up appointments.    MEDICATION ISSUES:  VANESSA reviewed as expected.  Discussed medication options and treatment plan of prescribed medication as well as the risks, benefits, and side effects including potential  falls, possible impaired driving and metabolic adversities among others. Patient is agreeable to call the office with any worsening of symptoms or onset of side effects. Patient is agreeable to call 911 or go to the nearest ER should he/she begin having SI/HI. No medication side effects or related complaints today.            This document has been electronically signed by Janet Wallace PA-C  September 18, 2023 16:22 EDT      Part of this note may be an electronic transcription/translation of spoken language to printed text using the Dragon Dictation System.

## 2023-09-20 ENCOUNTER — TELEPHONE (OUTPATIENT)
Dept: PSYCHIATRY | Facility: CLINIC | Age: 23
End: 2023-09-20
Payer: COMMERCIAL

## 2023-09-20 NOTE — TELEPHONE ENCOUNTER
PATIENT REFERRED OUT FOR PSYCHOTHERAPY ON 02/10/2023, SEVERAL ATTEMPTS MADE INCLUDING MAILING A CONTACT LETTER WITH NO SUCCESS, CLOSING OUT REFERRAL

## 2023-10-16 ENCOUNTER — OFFICE VISIT (OUTPATIENT)
Dept: PSYCHIATRY | Facility: CLINIC | Age: 23
End: 2023-10-16
Payer: COMMERCIAL

## 2023-10-16 DIAGNOSIS — F33.9 EPISODE OF RECURRENT MAJOR DEPRESSIVE DISORDER, UNSPECIFIED DEPRESSION EPISODE SEVERITY: ICD-10-CM

## 2023-10-16 DIAGNOSIS — F41.1 GAD (GENERALIZED ANXIETY DISORDER): Primary | ICD-10-CM

## 2023-10-16 PROCEDURE — 90837 PSYTX W PT 60 MINUTES: CPT | Performed by: SOCIAL WORKER
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
## 2023-10-31 DIAGNOSIS — F41.1 GENERALIZED ANXIETY DISORDER: ICD-10-CM

## 2023-10-31 DIAGNOSIS — F33.42 RECURRENT MAJOR DEPRESSIVE DISORDER, IN FULL REMISSION: ICD-10-CM

## 2023-10-31 RX ORDER — BUPROPION HYDROCHLORIDE 100 MG/1
100 TABLET ORAL 2 TIMES DAILY
Qty: 180 TABLET | Refills: 0 | OUTPATIENT
Start: 2023-10-31 | End: 2024-01-29

## 2023-10-31 NOTE — TELEPHONE ENCOUNTER
buPROPion (WELLBUTRIN) 100 MG tablet       Last ordered: 1 month ago (9/18/2023) by Janet Wallace PA-C     Follow up 12/11/2023

## 2023-11-06 ENCOUNTER — OFFICE VISIT (OUTPATIENT)
Dept: PSYCHIATRY | Facility: CLINIC | Age: 23
End: 2023-11-06
Payer: COMMERCIAL

## 2023-11-06 DIAGNOSIS — F41.1 GAD (GENERALIZED ANXIETY DISORDER): Primary | ICD-10-CM

## 2023-11-06 DIAGNOSIS — F33.9 EPISODE OF RECURRENT MAJOR DEPRESSIVE DISORDER, UNSPECIFIED DEPRESSION EPISODE SEVERITY: ICD-10-CM

## 2023-11-06 NOTE — PSYCHOTHERAPY NOTE
Did well on math test    Quit job,   claudette ignored me    , daed, TEA, ,  not romatic    [place], when I dont get I get it  I push harder   Never men in my life, they are all cheaters  cheaters, lyers,   Emotionally unavailable     Need someone to bicker with

## 2023-11-06 NOTE — PROGRESS NOTES
Progress Note    Date: 11/06/2023  Time In: 2:03  Time Out: 2:56    Patient Legal Name: Juliet Pittman  Patient Age: 23 y.o.    CHIEF COMPLAINT: Anxiety     Subjective   History of Present Illness         Juliet returns today for ongoing treatment.  Our most recent session was on 10/06/23.  At that time goals were   Psychotherapy for symptom management  Readdress previous goals related to activity and hobbies next session.     Assessment    Mental Status Exam     Appearance: good hygiene and dressed appropriately for the weather  Behavior: calm  Cooperation:  engaged, cooperative, attentive, and friendly  Eye Contact:  good  Affect:  congruent  Mood: expressive and cautious  Speech: responsive and talkative  Thought Process:  goal-oriented  Thought Content: appropriate  Suicidal: denies  Homicidal:  denies  Hallucinations:  denies  Memory:  intact  Orientation:  person, place, time, and situation  Reliability:  reliable  Insight:  good  Judgment:  good     Clinical Intervention       ICD-10-CM ICD-9-CM   1. GOLD (generalized anxiety disorder)  F41.1 300.02   2. Episode of recurrent major depressive disorder, unspecified depression episode severity  F33.9 296.30        ICD-10-CM ICD-9-CM     F41.1 300.02     F33.9 296.30     Juliet is a 23 y.o. female who presents today as a follow-up for continued psychotherapy. Individual psychotherapy was provided utilizing solution focused  techniques to provide symptom relief, support self-esteem, manage stress, recognize patterns of behavior, assess symptoms, challenge negative thinking patterns, and discuss interpersonal conflicts. Solution focused  Juliet has made changes since last visit.  She did leave her job.  She identifies all changes as a struggle, relates this to abandonment issues.  We spoke at length about contributing factors.     Plan   Plan & Goals     Psychotherapy for symptom relief   Challenge from of reference on relationship factors, how past relationships  contribute to current obstacles    Patient acknowledged and verbally consented to continue working toward resolving current treatment plan goals and was educated on the importance of participation in the therapeutic process.  Patient will remain compliant with medication regimen as prescribed. Discuss any medication side effects, questions or concerns with prescribing provider.  Call 911 or present to the nearest emergency room in an emergency situation.  National Suicide Prevention Lifeline: Call 988. The Lifeline provides 24/7, free and confidential support for people in distress, prevention and crisis resources.  Crisis Text Line  Text HOME To 418526    Return in about 3 weeks (around 11/27/2023).    ____________________  This document has been electronically signed by Maritza Escamilla LCSW  November 6, 2023 17:16 EST    Part of this note may be an electronic transcription/translation of spoken language to printed text using the Dragon Dictation System.    Did well on math test    Quit job,   sthey ignored me    , daed, TEA, ,  not romatic    Meedy, when I dont get I get it  I push harder   Never men in my life, they are all cheaters  cheaters, lyers,   Emotionally unavailable     Need someone to bicker with

## 2023-11-26 NOTE — PROGRESS NOTES
Progress Note    Date: 11/27/2023  Time In: 2:00pm   Time Out: 2:53pm     Patient Legal Name: Juliet Pittman  Patient Age: 23 y.o.    CHIEF COMPLAINT: mood     Subjective   History of Present Illness     Juliet returns for ongoing treatment.  Our most recent session was on 11/06/23.  At that time goals were   Psychotherapy for symptom relief   Challenge frame of reference on relationship factors, how past relationships contribute to current obstacles    Assessment    Mental Status Exam     Appearance: good hygiene and dressed appropriately for the weather  Behavior: calm  Cooperation:  engaged, cooperative, attentive, and friendly  Eye Contact:  good  Affect:  congruent  Mood: expressive and cautious  Speech: responsive and talkative  Thought Process:  organized and goal-oriented  Thought Content: appropriate  Suicidal: denies  Homicidal:  denies  Hallucinations:  denies  Memory:  intact  Orientation:  person, place, time, and situation  Reliability:  reliable  Insight:  good  Judgment:  good     Clinical Intervention       ICD-10-CM ICD-9-CM   1. Recurrent major depressive disorder, remission status unspecified  F33.9 296.30   2. GOLD (generalized anxiety disorder)  F41.1 300.02        Juliet is a 23 y.o. female who presents today as a follow-up for continued psychotherapy. Individual psychotherapy was provided utilizing solution focused  techniques to encourage expression of thoughts and feelings, support self-esteem, manage stress, identify triggers, recognize patterns of behavior, challenge negative thinking patterns, recognize cognitive distortions, provide support, and discuss interpersonal conflicts. Juliet does not voice concerns with symptomology  She reports likely sleeping too much but points to the lack of structure in her day as a contributing facto.  She is facing some challenges in anticipating caring for mother during the holidays after surgery.  Discussed feelings about this role that she is very familiar  with.      Plan   Plan & Goals     Making self care a priority   Psychotherapy for symptom relief.     Patient acknowledged and verbally consented to continue working toward resolving current treatment plan goals and was educated on the importance of participation in the therapeutic process.  Patient will remain compliant with medication regimen as prescribed. Discuss any medication side effects, questions or concerns with prescribing provider.  Call 911 or present to the nearest emergency room in an emergency situation.  National Suicide Prevention Lifeline: Call 988. The Lifeline provides 24/7, free and confidential support for people in distress, prevention and crisis resources.  Crisis Text Line  Text HOME To 698865    Return in about 3 weeks (around 12/18/2023).    ____________________  This document has been electronically signed by Maritza Escamilla LCSW  November 27, 2023 14:57 EST    Part of this note may be an electronic transcription/translation of spoken language to printed text using the Dragon Dictation System.

## 2023-11-27 ENCOUNTER — OFFICE VISIT (OUTPATIENT)
Dept: PSYCHIATRY | Facility: CLINIC | Age: 23
End: 2023-11-27
Payer: COMMERCIAL

## 2023-11-27 DIAGNOSIS — F41.1 GAD (GENERALIZED ANXIETY DISORDER): ICD-10-CM

## 2023-11-27 DIAGNOSIS — F33.9 RECURRENT MAJOR DEPRESSIVE DISORDER, REMISSION STATUS UNSPECIFIED: Primary | ICD-10-CM

## 2023-11-27 PROCEDURE — 90837 PSYTX W PT 60 MINUTES: CPT | Performed by: SOCIAL WORKER

## 2023-11-27 NOTE — PSYCHOTHERAPY NOTE
Sleeping too much   no schedule    Had lasix     Mom multiple surgeries from MVA, taking care of her     Not validated by mom or family , ex[ected    Will go to see friends in [place] before kim    Trying to read  On tik tok too much     Hunger games     Gym read  eat    Point out to mom what she was doing at 22 yo     [facility] next summer     [employer]ilates   Reji hour and half away, not showing interest

## 2023-12-11 ENCOUNTER — TELEMEDICINE (OUTPATIENT)
Dept: BEHAVIORAL HEALTH | Facility: CLINIC | Age: 23
End: 2023-12-11
Payer: COMMERCIAL

## 2023-12-11 DIAGNOSIS — F33.42 RECURRENT MAJOR DEPRESSIVE DISORDER, IN FULL REMISSION: ICD-10-CM

## 2023-12-11 DIAGNOSIS — F99 INSOMNIA DUE TO OTHER MENTAL DISORDER: ICD-10-CM

## 2023-12-11 DIAGNOSIS — F41.1 GENERALIZED ANXIETY DISORDER: Primary | ICD-10-CM

## 2023-12-11 DIAGNOSIS — F51.05 INSOMNIA DUE TO OTHER MENTAL DISORDER: ICD-10-CM

## 2023-12-11 PROCEDURE — 99214 OFFICE O/P EST MOD 30 MIN: CPT | Performed by: PHYSICIAN ASSISTANT

## 2023-12-11 RX ORDER — BUPROPION HYDROCHLORIDE 100 MG/1
100 TABLET ORAL 2 TIMES DAILY
Qty: 180 TABLET | Refills: 0 | Status: SHIPPED | OUTPATIENT
Start: 2023-12-11 | End: 2024-03-10

## 2023-12-11 NOTE — PROGRESS NOTES
This provider is located at [address], [address], [place] [address]. The Patient is seen remotely using [employer]. Patient is being seen via telehealth and confirm that they are in a secure environment for this session. The patient's condition being diagnosed/treated is appropriate for telemedicine. The provider identified herself as well as her credentials.   The patient gave consent to be seen remotely, and when consent is given they understand that the consent allows for patient identifiable information to be sent to a third party as needed.   They may refuse to be seen remotely at any time. The electronic data is encrypted and password protected, and the patient has been advised of the potential risks to privacy not withstanding such measures.    Patient is accepting of and agreeable to appointment.  The appointment consisted of the patient and I only.      Mode of visit: Video  [facility] [address], [place] [address].  Location of patient: Home  Does the patient consent to use a video/audio connection for your medical care today? Yes  The visit included audio and video interaction. No technical issues occurred during this visit.    Chief Complaint:  Depression, anxiety     History of Present Illness: Juliet Pittman is a 23 y.o. female who presents today for f/u of mood.  Patient has been taking Wellbutrin only once a day since she has been forgetting other dose.  Pt denies feeling depressed. Pt denies having any SI or HI. Pt reports sleep varies. Pt c/o anxiety that comes and goes, occurs daily, rates it a 6-7/10.  Patient notes having situational stressors such as not working at this time.  Pt will sometimes feel easily irritable. Pt has had palpitations a few times a week, was not having this before with taking Wellbutrin BID.  Patient has continued doing therapy with Marit[facility].    Medical Record Review: Reviewed office visit note from 1/23/23, Patient was  initially started on Vilazodone for depression and anxiety. She had previously tried several other medications but felt that they were not effective. This medication caused her to breakout in hives. She has since stopped taking it and began taking Prozac that was previously prescribed to her by different provider. She would like to continue taking this medication. She has recently started a new job and is trying to go to the gym as well to help with her mental health. She has an upcoming appointment with psychiatry on 2/10/2023.       PHQ-9 Depression Screening  Little interest or pleasure in doing things? (P) 1-->several days   Feeling down, depressed, or hopeless? (P) 0-->not at all   Trouble falling or staying asleep, or sleeping too much? (P) 1-->several days   Feeling tired or having little energy? (P) 1-->several days   Poor appetite or overeating? (P) 1-->several days   Feeling bad about yourself - or that you are a failure or have let yourself or your family down? (P) 0-->not at all   Trouble concentrating on things, such as reading the newspaper or watching television? (P) 1-->several days   Moving or speaking so slowly that other people could have noticed? Or the opposite - being so fidgety or restless that you have been moving around a lot more than usual? (P) 0-->not at all   Thoughts that you would be better off dead, or of hurting yourself in some way? (P) 0-->not at all   PHQ-9 Total Score (P) 5   If you checked off any problems, how difficult have these problems made it for you to do your work, take care of things at home, or get along with other people? (P) somewhat difficult         GOLD-7  Over the last 2 weeks, how often have you been bothered by any of the following problems?  Feeling nervous, anxious, or on edge: Several days  Not being able to stop or control worrying: Not at all  Worrying too much about different things: Several days  Trouble relaxing: Several days  Being so restless that it  is hard to sit still: Several days  Becoming easily annoyed or irritable: Several days  Feeling afraid as if something awful might happen: Not at all  GOLD-7 Total Score: 5        ROS:  Review of Systems   Constitutional:  Positive for fatigue. Negative for appetite change, diaphoresis and unexpected weight change.   HENT:  Negative for drooling, tinnitus and trouble swallowing.    Eyes:  Negative for visual disturbance.   Respiratory:  Negative for cough, chest tightness and shortness of breath.    Cardiovascular:  Positive for palpitations. Negative for chest pain.   Gastrointestinal:  Negative for abdominal pain, constipation, diarrhea, nausea and vomiting.   Endocrine: Negative for cold intolerance and heat intolerance.   Genitourinary:  Negative for difficulty urinating.   Musculoskeletal:  Negative for arthralgias and myalgias.   Skin:  Negative for rash.   Allergic/Immunologic: Negative for immunocompromised state.   Neurological:  Negative for dizziness, tremors, seizures and headaches.   Psychiatric/Behavioral:  Positive for sleep disturbance. Negative for agitation, dysphoric mood, hallucinations, self-injury and suicidal ideas. The patient is nervous/anxious.        Problem List:  Patient Active Problem List   Diagnosis    Generalized anxiety disorder       Current Medications:   Current Outpatient Medications   Medication Sig Dispense Refill    buPROPion (WELLBUTRIN) 100 MG tablet Take 1 tablet by mouth 2 (Two) Times a Day for 90 days. 180 tablet 0    Iron-Vitamin C (IRON 100/C PO) Take  by mouth.      melatonin 1 MG tablet Take  by mouth.      Probiotic Product (PROBIOTIC-10 PO) Take  by mouth.       No current facility-administered medications for this visit.       Discontinued Medications:  Medications Discontinued During This Encounter   Medication Reason    buPROPion (WELLBUTRIN) 100 MG tablet Reorder         Allergy:   Allergies   Allergen Reactions    Amoxicillin Unknown - Low Severity     Vilazodone Hives    Vilazodone Hcl Hives    Red Dye Rash        Past Medical History:  Past Medical History:   Diagnosis Date    Anxiety     Depression     PCOS (polycystic ovarian syndrome)        Past Surgical History:  No past surgical history on file.    Past Psychiatric History:  Began Treatment: 16 yr/o started therapy   Diagnoses: Depression, anxiety   Psychiatrist: Denies  Therapist: Pt has been in and out of therapy.   Admission History: Denies  Medications/Treatment: Lexapro (did well at first), Zoloft (decreased appetite), viibryd (hives), prozac, wellbutrin  Self Harm: Denies  Suicide Attempts: Denies  Postpartum depression: N/A    Family Psychiatric History:   Diagnoses: Her grandmother has a h/o autism and her father has a h/o depression.   Substance use: Denies  Suicide Attempts/Completions: Denies    Family History   Problem Relation Age of Onset    Diabetes Father     Depression Father        Substance Abuse History:   Alcohol use: Rare  Nicotine: Denies  Illicit Drug Use: Denies  Longest Period Sober: Denies  Rehab/AA/NA: Denies    Social History:  Living Situation: Pt lives with her mother.  Marital/Relationship History: Single  Children: Denies  Work History/Occupation: Pt works at the [employer].   Education: Pt completed high school, some college.    History: Denies  Legal: Denies    Social History     Socioeconomic History    Marital status: Single   Tobacco Use    Smoking status: Never     Passive exposure: Never    Smokeless tobacco: Never   Vaping Use    Vaping Use: Never used   Substance and Sexual Activity    Alcohol use: Never    Drug use: Never    Sexual activity: Not Currently     Partners: Male       Developmental History:   Place of birth: Pt was born in [place].   Siblings: Denies  Childhood: Pt reports mother was a single mom and was very broke growing up.       Physical Exam:  Physical Exam    Appearance: appears to be of stated age, maintains good eye contact.   Behavior:  "Appropriate, cooperative. No acute distress.  Motor: No abnormal movements  Speech: Coherent, spontaneous, appropriate with normal rate, volume, rhythm, and tone. Normal reaction time to questions. No hyperverbal or pressured speech.   Mood: \"I'm okay\"  Affect: Full range, appropriate, congruent with spontaneous emotional reactivity. Normal intensity. No emotional blunting.   Thought content: Negative suicidal ideations, negative homicidal ideations. Patient denies any obsession, compulsion, or phobia. No evidence of delusions.  Perceptions: Negative auditory hallucinations, negative visual hallucinations. Pt does not appear to be actively responding to internal stimuli.   Thought process: Logical, goal-directed, coherent, and linear with no evidence of flight of ideas, looseness of associations, thought blocking, circumstantiality, or tangentiality.   Insight/Judgement: Poor/fair  Cognition: Alert and oriented to person, place, and date. Memory intact for recent and remote events. Attention and concentration intact.     Vital Signs:   There were no vitals taken for this visit.     Lab Results:   Admission on 10/02/2023, Discharged on 10/02/2023   Component Date Value Ref Range Status    Rapid Strep A Screen 10/02/2023 Negative   Final    Internal Control 10/02/2023 Passed   Final    Lot Number 10/02/2023 [id number]   Final    Expiration Date 10/02/2023 12,[id number]   Final   Office Visit on 01/09/2023   Component Date Value Ref Range Status    Rapid Strep A Screen 01/09/2023 Negative  Negative, VALID, INVALID, Not Performed Final    Internal Control 01/09/2023 Passed  Passed Final    Lot Number 01/09/2023 [id number]   Final    Expiration Date 01/09/2023 91,324   Final    Throat Culture, Beta Strep 01/09/2023 No Beta Hemolytic Streptococcus Isolated   Final    Mono Qual W/Rflx Qn 01/09/2023 Negative  Negative Final    The sensitivity of Heterophile antibody testing is 80-90%.  Lindsey Barr IgM testing offers higher " sensitivity.    Glucose 01/09/2023 94  65 - 99 mg/dL Final    BUN 01/09/2023 9  6 - 20 mg/dL Final    Creatinine 01/09/2023 0.67  0.57 - 1.00 mg/dL Final    Sodium 01/09/2023 142  136 - 145 mmol/L Final    Potassium 01/09/2023 3.9  3.5 - 5.2 mmol/L Final    Chloride 01/09/2023 105  98 - 107 mmol/L Final    CO2 01/09/2023 26.9  22.0 - 29.0 mmol/L Final    Calcium 01/09/2023 9.5  8.6 - 10.5 mg/dL Final    BUN/Creatinine Ratio 01/09/2023 13.4  7.0 - 25.0 Final    Anion Gap 01/09/2023 10.1  5.0 - 15.0 mmol/L Final    eGFR 01/09/2023 127.7  >60.0 mL/min/1.73 Final    National Kidney Foundation and American Society of Nephrology (ASN) Task Force recommended calculation based on the Chronic Kidney Disease Epidemiology Collaboration (CKD-EPI) equation refit without adjustment for race.    WBC 01/09/2023 6.33  3.40 - 10.80 10*3/mm3 Final    RBC 01/09/2023 4.25  3.77 - 5.28 10*6/mm3 Final    Hemoglobin 01/09/2023 13.5  12.0 - 15.9 g/dL Final    Hematocrit 01/09/2023 39.0  34.0 - 46.6 % Final    MCV 01/09/2023 91.8  79.0 - 97.0 fL Final    MCH 01/09/2023 31.8  26.6 - 33.0 pg Final    MCHC 01/09/2023 34.6  31.5 - 35.7 g/dL Final    RDW 01/09/2023 12.1 (L)  12.3 - 15.4 % Final    RDW-SD 01/09/2023 40.5  37.0 - 54.0 fl Final    MPV 01/09/2023 10.3  6.0 - 12.0 fL Final    Platelets 01/09/2023 267  140 - 450 10*3/mm3 Final    Neutrophil % 01/09/2023 62.5  42.7 - 76.0 % Final    Lymphocyte % 01/09/2023 26.5  19.6 - 45.3 % Final    Monocyte % 01/09/2023 8.8  5.0 - 12.0 % Final    Eosinophil % 01/09/2023 1.7  0.3 - 6.2 % Final    Basophil % 01/09/2023 0.2  0.0 - 1.5 % Final    Immature Grans % 01/09/2023 0.3  0.0 - 0.5 % Final    Neutrophils, Absolute 01/09/2023 3.95  1.70 - 7.00 10*3/mm3 Final    Lymphocytes, Absolute 01/09/2023 1.68  0.70 - 3.10 10*3/mm3 Final    Monocytes, Absolute 01/09/2023 0.56  0.10 - 0.90 10*3/mm3 Final    Eosinophils, Absolute 01/09/2023 0.11  0.00 - 0.40 10*3/mm3 Final    Basophils, Absolute 01/09/2023 0.01   0.00 - 0.20 10*3/mm3 Final    Immature Grans, Absolute 01/09/2023 0.02  0.00 - 0.05 10*3/mm3 Final    nRBC 01/09/2023 0.0  0.0 - 0.2 /100 WBC Final       EKG Results:  No orders to display       Imaging Results:  No Images in the past 120 days found..      Assessment & Plan   Diagnoses and all orders for this visit:    1. Generalized anxiety disorder (Primary)  -     buPROPion (WELLBUTRIN) 100 MG tablet; Take 1 tablet by mouth 2 (Two) Times a Day for 90 days.  Dispense: 180 tablet; Refill: 0    2. Recurrent major depressive disorder, in full remission  -     buPROPion (WELLBUTRIN) 100 MG tablet; Take 1 tablet by mouth 2 (Two) Times a Day for 90 days.  Dispense: 180 tablet; Refill: 0    3. Insomnia due to other mental disorder      Patient screened positive for depression based on a PHQ-9 score of 5 on 12/11/2023. Follow-up recommendations include: Prescribed antidepressant medication treatment, Suicide Risk Assessment performed, and see assessment below .    Presentation seems most consistent with MDD, GOLD, insomnia.  Will continue Wellbutrin for management of depression, anxiety, and overall mood.  Counseled the patient to continue monitoring for palpitations, although does not appear to be related to Wellbutrin as she did not have this when taking it consistently twice daily.  Patient declines medication for sleep at this time.  Continue therapy. Instructed patient to contact the office for any new or worsening symptoms or any other concerns.  Follow-up in 1 month, we will schedule in person to monitor vital signs.  Addressed all questions and concerns.    Visit Diagnoses:    ICD-10-CM ICD-9-CM   1. Generalized anxiety disorder  F41.1 300.02   2. Recurrent major depressive disorder, in full remission  F33.42 296.36   3. Insomnia due to other mental disorder  F51.05 300.9    F99 327.02         PLAN:  Safety: No acute safety concerns at this time.  Therapy: Will refer for psychotherapy to Marit[facility]  Risk Assessment: Risk  of self-harm acutely is moderate.  Risk factors include anxiety disorder, mood disorder, and recent psychosocial stressors (pandemic). Protective factors include no family history, denies access to guns/weapons, no history of suicide attempts or self-harm in the past, no present SI, minimal AODA, healthcare seeking, future orientation, willingness to engage in care.  Risk of self-harm chronically is also moderate, but could be further elevated in the event of treatment noncompliance and/or AODA.  Labs/Diagnostics Ordered:   No orders of the defined types were placed in this encounter.    Medications:   New Medications Ordered This Visit   Medications    buPROPion (WELLBUTRIN) 100 MG tablet     Sig: Take 1 tablet by mouth 2 (Two) Times a Day for 90 days.     Dispense:  180 tablet     Refill:  0       Discussed all risks, benefits, alternatives, and side effects of Bupropion including but not limited to GI upset (N/V/D, constipation), tachycardia, diaphoresis, weight loss, agitation, dizziness, headache, insomnia, tremor, blurred vision, anorexia, HTN, activation of more or hypomania, CNS stimulation and neuropsychiatric effects, ocular effects, seizure risk, withdrawal syndrome following abrupt discontinuation, and activation of suicidal ideation and behavior. Pt educated on the need to practice safe sex while taking this med. Discussed the need for pt to immediately call the office for any new or worsening symptoms, such as worsening depression; feeling nervous or restless; suicidal thoughts or actions; or other changes changes in mood or behavior, and all other concerns. Pt educated on med compliance. Pt verbalized understanding and is agreeable to taking Bupropion. Addressed all questions and concerns.     Follow up:   F/u in 1 month    TREATMENT PLAN/GOALS: Continue supportive psychotherapy efforts and medications as indicated. Treatment and medication options discussed during today's visit. Patient ackowledged  and verbally consented to continue with current treatment plan and was educated on the importance of compliance with treatment and follow-up appointments.    MEDICATION ISSUES:  VANESSA reviewed as expected.  Discussed medication options and treatment plan of prescribed medication as well as the risks, benefits, and side effects including potential falls, possible impaired driving and metabolic adversities among others. Patient is agreeable to call the office with any worsening of symptoms or onset of side effects. Patient is agreeable to call 911 or go to the nearest ER should he/she begin having SI/HI. No medication side effects or related complaints today.            This document has been electronically signed by Janet Wallace PA-C  December 11, 2023 15:01 EST      Part of this note may be an electronic transcription/translation of spoken language to printed text using the Dragon Dictation System.

## 2024-01-19 ENCOUNTER — OFFICE VISIT (OUTPATIENT)
Dept: BEHAVIORAL HEALTH | Facility: CLINIC | Age: 24
End: 2024-01-19
Payer: COMMERCIAL

## 2024-01-19 VITALS
WEIGHT: 144.2 LBS | HEIGHT: 68 IN | DIASTOLIC BLOOD PRESSURE: 84 MMHG | SYSTOLIC BLOOD PRESSURE: 124 MMHG | BODY MASS INDEX: 21.86 KG/M2

## 2024-01-19 DIAGNOSIS — F33.1 MODERATE EPISODE OF RECURRENT MAJOR DEPRESSIVE DISORDER: Primary | ICD-10-CM

## 2024-01-19 DIAGNOSIS — F41.1 GENERALIZED ANXIETY DISORDER: ICD-10-CM

## 2024-01-19 DIAGNOSIS — F51.05 INSOMNIA DUE TO OTHER MENTAL DISORDER: ICD-10-CM

## 2024-01-19 DIAGNOSIS — F99 INSOMNIA DUE TO OTHER MENTAL DISORDER: ICD-10-CM

## 2024-01-19 RX ORDER — BUPROPION HYDROCHLORIDE 150 MG/1
150 TABLET ORAL EVERY MORNING
Qty: 90 TABLET | Refills: 0 | Status: SHIPPED | OUTPATIENT
Start: 2024-01-19 | End: 2024-04-18

## 2024-01-19 NOTE — PROGRESS NOTES
"    Chief Complaint:  Depression, anxiety     History of Present Illness: Juliet Pittman is a 24 y.o. female who presents today for f/u of mood. Pt has been taking Wellbutrin BID \"for the majority of the time,\" taking it \"94% of the time\". Pt c/o depression that comes and goes, occurs a few times a week, rates it a 7/10. Pt will have a few episodes of crying a week. She also c/o anhedonia and hopelessness.  Patient denies having any current SI or HI at this time.  Pt will have intermittent SI that is passive, occurs a few times a week. No plan or intent.  Patient states she would never act upon this.  Pt reports mood declines during the winter months. Pt has had difficulty sleeping. Pt c/o anxiety that comes and goes, occurs daily, rates it a 4/10.  Patient notes having situational stressors such as not working at this time.  Pt will sometimes feel easily irritable. Pt reports palpitations have been better and are not very often, thinks this is likely related to anxiety. Patient has continued doing therapy with [facility].    Medical Record Review: Reviewed office visit note from 1/23/23, Patient was initially started on Vilazodone for depression and anxiety. She had previously tried several other medications but felt that they were not effective. This medication caused her to breakout in hives. She has since stopped taking it and began taking Prozac that was previously prescribed to her by different provider. She would like to continue taking this medication. She has recently started a new job and is trying to go to the gym as well to help with her mental health. She has an upcoming appointment with psychiatry on 2/10/2023.       PHQ-9 Depression Screening  Little interest or pleasure in doing things?     Feeling down, depressed, or hopeless?     Trouble falling or staying asleep, or sleeping too much?     Feeling tired or having little energy?     Poor appetite or overeating?     Feeling bad about yourself - or that you " are a failure or have let yourself or your family down?     Trouble concentrating on things, such as reading the newspaper or watching television?     Moving or speaking so slowly that other people could have noticed? Or the opposite - being so fidgety or restless that you have been moving around a lot more than usual?     Thoughts that you would be better off dead, or of hurting yourself in some way?     PHQ-9 Total Score     If you checked off any problems, how difficult have these problems made it for you to do your work, take care of things at home, or get along with other people?           GOLD-7           ROS:  Review of Systems   Constitutional:  Positive for fatigue. Negative for appetite change, diaphoresis and unexpected weight change.   HENT:  Negative for drooling, tinnitus and trouble swallowing.    Eyes:  Negative for visual disturbance.   Respiratory:  Negative for cough, chest tightness and shortness of breath.    Cardiovascular:  Positive for palpitations. Negative for chest pain.   Gastrointestinal:  Negative for abdominal pain, constipation, diarrhea, nausea and vomiting.   Endocrine: Negative for cold intolerance and heat intolerance.   Genitourinary:  Negative for difficulty urinating.   Musculoskeletal:  Negative for arthralgias and myalgias.   Skin:  Negative for rash.   Allergic/Immunologic: Negative for immunocompromised state.   Neurological:  Negative for dizziness, tremors, seizures and headaches.   Psychiatric/Behavioral:  Positive for agitation, dysphoric mood and sleep disturbance. Negative for hallucinations, self-injury and suicidal ideas. The patient is nervous/anxious.        Problem List:  Patient Active Problem List   Diagnosis    Generalized anxiety disorder       Current Medications:   Current Outpatient Medications   Medication Sig Dispense Refill    Iron-Vitamin C (IRON 100/C PO) Take  by mouth.      melatonin 1 MG tablet Take  by mouth.      Probiotic Product (PROBIOTIC-10  PO) Take  by mouth.      buPROPion XL (Wellbutrin XL) 150 MG 24 hr tablet Take 1 tablet by mouth Every Morning for 90 days. 90 tablet 0     No current facility-administered medications for this visit.       Discontinued Medications:  Medications Discontinued During This Encounter   Medication Reason    buPROPion (WELLBUTRIN) 100 MG tablet            Allergy:   Allergies   Allergen Reactions    Amoxicillin Unknown - Low Severity    Vilazodone Hives    Vilazodone Hcl Hives    Red Dye Rash        Past Medical History:  Past Medical History:   Diagnosis Date    Anxiety     Depression     PCOS (polycystic ovarian syndrome)        Past Surgical History:  No past surgical history on file.    Past Psychiatric History:  Began Treatment: 16 yr/o started therapy   Diagnoses: Depression, anxiety   Psychiatrist: Denies  Therapist: Pt has been in and out of therapy.   Admission History: Denies  Medications/Treatment: Lexapro (did well at first), Zoloft (decreased appetite), viibryd (hives), prozac, wellbutrin  Self Harm: Denies  Suicide Attempts: Denies  Postpartum depression: N/A    Family Psychiatric History:   Diagnoses: Her grandmother has a h/o autism and her father has a h/o depression.   Substance use: Denies  Suicide Attempts/Completions: Denies    Family History   Problem Relation Age of Onset    Diabetes Father     Depression Father        Substance Abuse History:   Alcohol use: Rare  Nicotine: Denies  Illicit Drug Use: Denies  Longest Period Sober: Denies  Rehab/AA/NA: Denies    Social History:  Living Situation: Pt lives with her mother.  Marital/Relationship History: Single  Children: Denies  Work History/Occupation: Pt works at the [employer].   Education: Pt completed high school, some college.    History: Denies  Legal: Denies    Social History     Socioeconomic History    Marital status: Single   Tobacco Use    Smoking status: Never     Passive exposure: Never    Smokeless tobacco: Never   Vaping Use     "Vaping Use: Never used   Substance and Sexual Activity    Alcohol use: Never    Drug use: Never    Sexual activity: Not Currently     Partners: Male       Developmental History:   Place of birth: Pt was born in [place].   Siblings: Denies  Childhood: Pt reports mother was a single mom and was very broke growing up.       Physical Exam:  Physical Exam    Appearance: Well-groomed with adequate hygiene, appears to be of stated age. Casually and neatly dressed, maintains good eye contact.   Behavior: Appropriate, cooperative. No acute distress.  Motor: No abnormal movements, tics or tremors are noted. No psychomotor agitation or retardation.  Speech: Coherent, spontaneous, appropriate with normal rate, volume, rhythm, and tone. Normal reaction time to questions. No hyperverbal or pressured speech.   Mood: \"I'm okay\"  Affect: Full range, appropriate, congruent with spontaneous emotional reactivity. Normal intensity. No emotional blunting.   Thought content: Negative suicidal ideations, negative homicidal ideations. Patient denies any obsession, compulsion, or phobia. No evidence of delusions.  Perceptions: Negative auditory hallucinations, negative visual hallucinations. Pt does not appear to be actively responding to internal stimuli.   Thought process: Logical, goal-directed, coherent, and linear with no evidence of flight of ideas, looseness of associations, thought blocking, circumstantiality, or tangentiality.   Insight/Judgement: Fair/fair  Cognition: Alert and oriented to person, place, and date. Memory intact for recent and remote events. Attention and concentration intact.       Vital Signs:   /84   Ht 172.7 cm (67.99\")   Wt 65.4 kg (144 lb 3.2 oz)   BMI 21.93 kg/m²      Lab Results:   Admission on 10/02/2023, Discharged on 10/02/2023   Component Date Value Ref Range Status    Rapid Strep A Screen 10/02/2023 Negative   Final    Internal Control 10/02/2023 Passed   Final    Lot Number 10/02/2023 [id number]  "  Final    Expiration Date 10/02/2023 [id number]   Final       EKG Results:  No orders to display       Imaging Results:  No Images in the past 120 days found..      Assessment & Plan   Diagnoses and all orders for this visit:    1. Moderate episode of recurrent major depressive disorder (Primary)  -     buPROPion XL (Wellbutrin XL) 150 MG 24 hr tablet; Take 1 tablet by mouth Every Morning for 90 days.  Dispense: 90 tablet; Refill: 0    2. Generalized anxiety disorder  -     buPROPion XL (Wellbutrin XL) 150 MG 24 hr tablet; Take 1 tablet by mouth Every Morning for 90 days.  Dispense: 90 tablet; Refill: 0    3. Insomnia due to other mental disorder        Patient screened positive for depression based on a PHQ-9 score of 5 on 12/11/2023. Follow-up recommendations include: Prescribed antidepressant medication treatment, Suicide Risk Assessment performed, and see assessment below .    Presentation seems most consistent with MDD, GOLD, insomnia.  Counseled on the need to be compliant with prescribed meds.  We will increase Wellbutrin for management of depression, anxiety, and overall mood.  Continue monitoring for palpitations although it does appear to be related to anxiety.  Patient finds medication for sleep and states she is going to try melatonin.  Recommended light lamp. Continue therapy. Instructed patient to contact the office for any new or worsening symptoms or any other concerns.  Follow-up in 1 month.  Addressed all questions and concerns.    Visit Diagnoses:    ICD-10-CM ICD-9-CM   1. Moderate episode of recurrent major depressive disorder  F33.1 296.32   2. Generalized anxiety disorder  F41.1 300.02   3. Insomnia due to other mental disorder  F51.05 300.9    F99 327.02           PLAN:  Safety: No acute safety concerns at this time.  Therapy: Will refer for psychotherapy to [facility]  Risk Assessment: Risk of self-harm acutely is moderate to severe.  Risk factors include anxiety disorder, mood disorder,  intermittent SI (passive, no plan or intent, no present SI), and recent psychosocial stressors (pandemic). Protective factors include no family history, denies access to guns/weapons, no history of suicide attempts or self-harm in the past, minimal AODA, healthcare seeking, future orientation, willingness to engage in care.  Risk of self-harm chronically is also moderate to severe, but could be further elevated in the event of treatment noncompliance and/or AODA.  Labs/Diagnostics Ordered:   No orders of the defined types were placed in this encounter.    Medications:   New Medications Ordered This Visit   Medications    buPROPion XL (Wellbutrin XL) 150 MG 24 hr tablet     Sig: Take 1 tablet by mouth Every Morning for 90 days.     Dispense:  90 tablet     Refill:  0       Discussed all risks, benefits, alternatives, and side effects of Bupropion including but not limited to GI upset (N/V/D, constipation), tachycardia, diaphoresis, weight loss, agitation, dizziness, headache, insomnia, tremor, blurred vision, anorexia, HTN, activation of more or hypomania, CNS stimulation and neuropsychiatric effects, ocular effects, seizure risk, withdrawal syndrome following abrupt discontinuation, and activation of suicidal ideation and behavior. Pt educated on the need to practice safe sex while taking this med. Discussed the need for pt to immediately call the office for any new or worsening symptoms, such as worsening depression; feeling nervous or restless; suicidal thoughts or actions; or other changes changes in mood or behavior, and all other concerns. Pt educated on med compliance. Pt verbalized understanding and is agreeable to taking Bupropion. Addressed all questions and concerns.     Follow up:   F/u in 1 month    TREATMENT PLAN/GOALS: Continue supportive psychotherapy efforts and medications as indicated. Treatment and medication options discussed during today's visit. Patient ackowledged and verbally consented to  continue with current treatment plan and was educated on the importance of compliance with treatment and follow-up appointments.    MEDICATION ISSUES:  VANESSA reviewed as expected.  Discussed medication options and treatment plan of prescribed medication as well as the risks, benefits, and side effects including potential falls, possible impaired driving and metabolic adversities among others. Patient is agreeable to call the office with any worsening of symptoms or onset of side effects. Patient is agreeable to call 911 or go to the nearest ER should he/she begin having SI/HI. No medication side effects or related complaints today.            This document has been electronically signed by Janet Wallace PA-C  January 19, 2024 11:17 EST      Part of this note may be an electronic transcription/translation of spoken language to printed text using the Dragon Dictation System.

## 2024-01-26 ENCOUNTER — OFFICE VISIT (OUTPATIENT)
Dept: PSYCHIATRY | Facility: CLINIC | Age: 24
End: 2024-01-26
Payer: COMMERCIAL

## 2024-01-26 DIAGNOSIS — F41.1 GAD (GENERALIZED ANXIETY DISORDER): ICD-10-CM

## 2024-01-26 DIAGNOSIS — F33.9 EPISODE OF RECURRENT MAJOR DEPRESSIVE DISORDER, UNSPECIFIED DEPRESSION EPISODE SEVERITY: Primary | ICD-10-CM

## 2024-01-26 NOTE — PROGRESS NOTES
"Progress Note    Date: 01/26/2024  Time In: -2:01  Time Out: 02:55    Patient Legal Name: Juliet Pittman  Patient Age: 24 y.o.    CHIEF COMPLAINT: anxiety and depression     Subjective   History of Present Illness     Juliet returns for ongoing treatment, Goals from our previous sesssion were   Making self care a priority   Psychotherapy for symptom relief    Assessment    Mental Status Exam     Appearance: good hygiene and dressed appropriately for the weather  Behavior: calm  Cooperation:  engaged, cooperative, attentive, and friendly  Eye Contact:  good  Affect:  congruent  Mood: expressive  Speech: responsive and talkative  Thought Process:  organized and goal-oriented  Thought Content: appropriate  Suicidal: denies  Homicidal:  denies  Hallucinations:  denies  Memory:  intact  Orientation:  person, place, time, and situation  Reliability:  reliable  Insight:  good  Judgment:  good     Clinical Intervention     No diagnosis found.   . Recurrent major depressive disorder, remission status unspecified  F33.9 296.30   2. GOLD (generalized anxiety disorder)        Juliet is a 24 y.o. female who presents today as a follow-up for continued psychotherapy. Individual psychotherapy was provided utilizing solution focused  techniques to provide symptom relief, support self-esteem, encourage change talk, discuss values and strengths, manage stress, identify triggers, recognize patterns of behavior, confront irrational ideas, identify strengths, assess symptoms, challenge negative thinking patterns, and provide support.  Juliet reports a recent medication change.  She has been feeling \"off\" for the past couple of weeks.  Reports ow energy, poor habits. She feels isolated and gets anxious often when having to socialize with people in the community.  She has just started another semester and has work ahead and that will force her into a better routine.  She was able to visit friends over the holidays which was very good for her.. "     Plan   Plan & Goals     Establish healthy routines.  Limit social media   Psychotherapy for symptom relief.     Patient acknowledged and verbally consented to continue working toward resolving current treatment plan goals and was educated on the importance of participation in the therapeutic process.  Patient will remain compliant with medication regimen as prescribed. Discuss any medication side effects, questions or concerns with prescribing provider.  Call 911 or present to the nearest emergency room in an emergency situation.  National Suicide Prevention Lifeline: Call 988. The Lifeline provides 24/7, free and confidential support for people in distress, prevention and crisis resources.  Crisis Text Line  Text HOME To 977060    No follow-ups on file.    ____________________  This document has been electronically signed by Maritza Escamilla LCSW  January 26, 2024 09:07 EST    Part of this note may be an electronic transcription/translation of spoken language to printed text using the Dragon Dictation System

## 2024-01-26 NOTE — PSYCHOTHERAPY NOTE
Saw ex, we are speaking,  started school again,  Unmotivated, going to gym   Weather?  SAD  light box   Pet sit     Reji   he wants to gee everything    Social media     Intentional to hang out with my gf once a week     I feel like meds are never going to work for me, I mess it up   When anxiety is up, depression is down and depression is up anxiety is down.    Body reacting,  I become aware, slow my hurry down  Breathing, grounding     I was raised to be afraid of things

## 2024-02-02 ENCOUNTER — TELEPHONE (OUTPATIENT)
Dept: PSYCHIATRY | Facility: CLINIC | Age: 24
End: 2024-02-02
Payer: COMMERCIAL

## 2024-02-19 ENCOUNTER — TELEMEDICINE (OUTPATIENT)
Dept: BEHAVIORAL HEALTH | Facility: CLINIC | Age: 24
End: 2024-02-19
Payer: COMMERCIAL

## 2024-02-19 DIAGNOSIS — F41.1 GENERALIZED ANXIETY DISORDER: Primary | ICD-10-CM

## 2024-02-19 DIAGNOSIS — F33.0 MILD EPISODE OF RECURRENT MAJOR DEPRESSIVE DISORDER: ICD-10-CM

## 2024-02-19 DIAGNOSIS — F99 INSOMNIA DUE TO OTHER MENTAL DISORDER: ICD-10-CM

## 2024-02-19 DIAGNOSIS — F51.05 INSOMNIA DUE TO OTHER MENTAL DISORDER: ICD-10-CM

## 2024-02-19 PROCEDURE — 99214 OFFICE O/P EST MOD 30 MIN: CPT | Performed by: PHYSICIAN ASSISTANT

## 2024-02-19 RX ORDER — BUPROPION HYDROCHLORIDE 150 MG/1
150 TABLET ORAL EVERY MORNING
Qty: 90 TABLET | Refills: 0 | Status: SHIPPED | OUTPATIENT
Start: 2024-02-19 | End: 2024-05-19

## 2024-02-19 NOTE — PROGRESS NOTES
This provider is located at [address] [facility] [address], [address], [place] [address]. The Patient is seen remotely using [employer]. Patient is being seen via telehealth and confirm that they are in a secure environment for this session. The patient's condition being diagnosed/treated is appropriate for telemedicine. The provider identified herself as well as her credentials.   The patient gave consent to be seen remotely, and when consent is given they understand that the consent allows for patient identifiable information to be sent to a third party as needed.   They may refuse to be seen remotely at any time. The electronic data is encrypted and password protected, and the patient has been advised of the potential risks to privacy not withstanding such measures.    Patient is accepting of and agreeable to appointment.  The appointment consisted of the patient and I only.      Mode of visit: Video  [facility] [address], [place] [address].  Location of patient: Home  Does the patient consent to use a video/audio connection for your medical care today? Yes  The visit included audio and video interaction. No technical issues occurred during this visit.    Chief Complaint:  Depression, anxiety     History of Present Illness: Juliet Pittman is a 24 y.o. female who presents today for f/u of mood.  Patient has been taking Wellbutrin as prescribed and tolerating well without any complications.  Pt c/o depression that comes and goes, occurs once a week, rates it a 5/10. Pt has been having crying episode once a week. She also c/o anhedonia and hopelessness, no change.  Patient denies having any current SI or HI at this time. No SI since last visit. Patient states she would never act upon this.  Pt reports mood declines during the winter months. Pt has had difficulty sleeping, has been slightly better. Pt has had less napping. Pt c/o anxiety that comes and goes, is no longer daily, occurs  once a week, rates it a 6-6.5/10. Pt will sometimes feel easily irritable, has improved since last visit. No palpitations. Patient has continued doing therapy with [facility].    Medical Record Review: Reviewed office visit note from 1/23/23, Patient was initially started on Vilazodone for depression and anxiety. She had previously tried several other medications but felt that they were not effective. This medication caused her to breakout in hives. She has since stopped taking it and began taking Prozac that was previously prescribed to her by different provider. She would like to continue taking this medication. She has recently started a new job and is trying to go to the gym as well to help with her mental health. She has an upcoming appointment with psychiatry on 2/10/2023.       PHQ-9 Depression Screening  Little interest or pleasure in doing things?     Feeling down, depressed, or hopeless?     Trouble falling or staying asleep, or sleeping too much?     Feeling tired or having little energy?     Poor appetite or overeating?     Feeling bad about yourself - or that you are a failure or have let yourself or your family down?     Trouble concentrating on things, such as reading the newspaper or watching television?     Moving or speaking so slowly that other people could have noticed? Or the opposite - being so fidgety or restless that you have been moving around a lot more than usual?     Thoughts that you would be better off dead, or of hurting yourself in some way?     PHQ-9 Total Score     If you checked off any problems, how difficult have these problems made it for you to do your work, take care of things at home, or get along with other people?           GOLD-7           ROS:  Review of Systems   Constitutional:  Positive for fatigue. Negative for appetite change, diaphoresis and unexpected weight change.   HENT:  Negative for drooling, tinnitus and trouble swallowing.    Eyes:  Negative for visual disturbance.    Respiratory:  Negative for cough, chest tightness and shortness of breath.    Cardiovascular:  Negative for chest pain and palpitations.   Gastrointestinal:  Negative for abdominal pain, constipation, diarrhea, nausea and vomiting.   Endocrine: Negative for cold intolerance and heat intolerance.   Genitourinary:  Negative for difficulty urinating.   Musculoskeletal:  Negative for arthralgias and myalgias.   Skin:  Negative for rash.   Allergic/Immunologic: Negative for immunocompromised state.   Neurological:  Negative for dizziness, tremors, seizures and headaches.   Psychiatric/Behavioral:  Positive for agitation, dysphoric mood and sleep disturbance. Negative for hallucinations, self-injury and suicidal ideas. The patient is nervous/anxious.        Problem List:  Patient Active Problem List   Diagnosis    Generalized anxiety disorder       Current Medications:   Current Outpatient Medications   Medication Sig Dispense Refill    buPROPion XL (Wellbutrin XL) 150 MG 24 hr tablet Take 1 tablet by mouth Every Morning for 90 days. 90 tablet 0    Iron-Vitamin C (IRON 100/C PO) Take  by mouth.      melatonin 1 MG tablet Take  by mouth.      Probiotic Product (PROBIOTIC-10 PO) Take  by mouth.       No current facility-administered medications for this visit.       Discontinued Medications:  Medications Discontinued During This Encounter   Medication Reason    buPROPion XL (Wellbutrin XL) 150 MG 24 hr tablet Reorder             Allergy:   Allergies   Allergen Reactions    Amoxicillin Unknown - Low Severity    Vilazodone Hives    Vilazodone Hcl Hives    Red Dye Rash        Past Medical History:  Past Medical History:   Diagnosis Date    Anxiety     Depression     PCOS (polycystic ovarian syndrome)        Past Surgical History:  No past surgical history on file.    Past Psychiatric History:  Began Treatment: 16 yr/o started therapy   Diagnoses: Depression, anxiety   Psychiatrist: Denies  Therapist: Pt has been in and out  "of therapy.   Admission History: Denies  Medications/Treatment: Lexapro (did well at first), Zoloft (decreased appetite), viibryd (hives), prozac, wellbutrin  Self Harm: Denies  Suicide Attempts: Denies  Postpartum depression: N/A    Family Psychiatric History:   Diagnoses: Her grandmother has a h/o autism and her father has a h/o depression.   Substance use: Denies  Suicide Attempts/Completions: Denies    Family History   Problem Relation Age of Onset    Diabetes Father     Depression Father        Substance Abuse History:   Alcohol use: Rare  Nicotine: Denies  Illicit Drug Use: Denies  Longest Period Sober: Denies  Rehab/AA/NA: Denies    Social History:  Living Situation: Pt lives with her mother.  Marital/Relationship History: Single  Children: Denies  Work History/Occupation: Pt works at the [employer].   Education: Pt completed high school, some college.    History: Denies  Legal: Denies    Social History     Socioeconomic History    Marital status: Single   Tobacco Use    Smoking status: Never     Passive exposure: Never    Smokeless tobacco: Never   Vaping Use    Vaping Use: Never used   Substance and Sexual Activity    Alcohol use: Never    Drug use: Never    Sexual activity: Not Currently     Partners: Male       Developmental History:   Place of birth: Pt was born in [place].   Siblings: Denies  Childhood: Pt reports mother was a single mom and was very broke growing up.       Physical Exam:  Physical Exam    Appearance: appears to be of stated age, maintains good eye contact.   Behavior: Appropriate, cooperative. No acute distress.  Motor: No abnormal movements  Speech: Coherent, spontaneous, appropriate with normal rate, volume, rhythm, and tone. Normal reaction time to questions. No hyperverbal or pressured speech.   Mood: \"I'm okay\"  Affect: Full range, appropriate, congruent with spontaneous emotional reactivity. Normal intensity. No emotional blunting, no change. Pt is pleasant.  Thought " content: Negative suicidal ideations, negative homicidal ideations. Patient denies any obsession, compulsion, or phobia. No evidence of delusions.  Perceptions: Negative auditory hallucinations, negative visual hallucinations. Pt does not appear to be actively responding to internal stimuli.   Thought process: Logical, goal-directed, coherent, and linear with no evidence of flight of ideas, looseness of associations, thought blocking, circumstantiality, or tangentiality.   Insight/Judgement: Fair/fair  Cognition: Alert and oriented to person, place, and date. Memory intact for recent and remote events. Attention and concentration intact.       Vital Signs:   There were no vitals taken for this visit.     Lab Results:   Admission on 10/02/2023, Discharged on 10/02/2023   Component Date Value Ref Range Status    Rapid Strep A Screen 10/02/2023 Negative   Final    Internal Control 10/02/2023 Passed   Final    Lot Number 10/02/2023 [id number]   Final    Expiration Date 10/02/2023 12,[id number]   Final       EKG Results:  No orders to display       Imaging Results:  No Images in the past 120 days found..      Assessment & Plan   Diagnoses and all orders for this visit:    1. Generalized anxiety disorder (Primary)  -     buPROPion XL (Wellbutrin XL) 150 MG 24 hr tablet; Take 1 tablet by mouth Every Morning for 90 days.  Dispense: 90 tablet; Refill: 0    2. Mild episode of recurrent major depressive disorder  -     buPROPion XL (Wellbutrin XL) 150 MG 24 hr tablet; Take 1 tablet by mouth Every Morning for 90 days.  Dispense: 90 tablet; Refill: 0    3. Insomnia due to other mental disorder        Patient screened positive for depression based on a PHQ-9 score of 5 on 12/11/2023. Follow-up recommendations include: Prescribed antidepressant medication treatment, Suicide Risk Assessment performed, and see assessment below .    Presentation seems most consistent with MDD, GOLD, insomnia.  Patient declines medication changes and feels  like her mood is manageable.  We will continue Wellbutrin for management of depression, anxiety, and overall mood.  Patient declines medication for sleep.  Recommended light lamp. Continue therapy. Instructed patient to contact the office for any new or worsening symptoms or any other concerns.  Follow-up in 2 months.  Addressed all questions and concerns.    Visit Diagnoses:    ICD-10-CM ICD-9-CM   1. Generalized anxiety disorder  F41.1 300.02   2. Mild episode of recurrent major depressive disorder  F33.0 296.31   3. Insomnia due to other mental disorder  F51.05 300.9    F99 327.02             PLAN:  Safety: No acute safety concerns at this time.  Therapy: Will refer for psychotherapy to [facility]  Risk Assessment: Risk of self-harm acutely is moderate to severe.  Risk factors include anxiety disorder, mood disorder, intermittent SI (passive, no plan or intent, no present SI), and recent psychosocial stressors (pandemic). Protective factors include no family history, denies access to guns/weapons, no history of suicide attempts or self-harm in the past, minimal AODA, healthcare seeking, future orientation, willingness to engage in care.  Risk of self-harm chronically is also moderate to severe, but could be further elevated in the event of treatment noncompliance and/or AODA.  Labs/Diagnostics Ordered:   No orders of the defined types were placed in this encounter.    Medications:   New Medications Ordered This Visit   Medications    buPROPion XL (Wellbutrin XL) 150 MG 24 hr tablet     Sig: Take 1 tablet by mouth Every Morning for 90 days.     Dispense:  90 tablet     Refill:  0       Discussed all risks, benefits, alternatives, and side effects of Bupropion including but not limited to GI upset (N/V/D, constipation), tachycardia, diaphoresis, weight loss, agitation, dizziness, headache, insomnia, tremor, blurred vision, anorexia, HTN, activation of more or hypomania, CNS stimulation and neuropsychiatric effects,  ocular effects, seizure risk, withdrawal syndrome following abrupt discontinuation, and activation of suicidal ideation and behavior. Pt educated on the need to practice safe sex while taking this med. Discussed the need for pt to immediately call the office for any new or worsening symptoms, such as worsening depression; feeling nervous or restless; suicidal thoughts or actions; or other changes changes in mood or behavior, and all other concerns. Pt educated on med compliance. Pt verbalized understanding and is agreeable to taking Bupropion. Addressed all questions and concerns.     Follow up:   F/u in 2 months.    TREATMENT PLAN/GOALS: Continue supportive psychotherapy efforts and medications as indicated. Treatment and medication options discussed during today's visit. Patient ackowledged and verbally consented to continue with current treatment plan and was educated on the importance of compliance with treatment and follow-up appointments.    MEDICATION ISSUES:  VANESSA reviewed as expected.  Discussed medication options and treatment plan of prescribed medication as well as the risks, benefits, and side effects including potential falls, possible impaired driving and metabolic adversities among others. Patient is agreeable to call the office with any worsening of symptoms or onset of side effects. Patient is agreeable to call 911 or go to the nearest ER should he/she begin having SI/HI. No medication side effects or related complaints today.            This document has been electronically signed by Janet Wallace PA-C  February 19, 2024 11:55 EST      Part of this note may be an electronic transcription/translation of spoken language to printed text using the Dragon Dictation System.

## 2024-04-22 ENCOUNTER — TELEMEDICINE (OUTPATIENT)
Dept: BEHAVIORAL HEALTH | Facility: CLINIC | Age: 24
End: 2024-04-22
Payer: COMMERCIAL

## 2024-04-22 DIAGNOSIS — F99 INSOMNIA DUE TO OTHER MENTAL DISORDER: ICD-10-CM

## 2024-04-22 DIAGNOSIS — F41.1 GENERALIZED ANXIETY DISORDER: ICD-10-CM

## 2024-04-22 DIAGNOSIS — F51.05 INSOMNIA DUE TO OTHER MENTAL DISORDER: ICD-10-CM

## 2024-04-22 DIAGNOSIS — F33.0 MILD EPISODE OF RECURRENT MAJOR DEPRESSIVE DISORDER: ICD-10-CM

## 2024-04-22 DIAGNOSIS — F32.81 PMDD (PREMENSTRUAL DYSPHORIC DISORDER): Primary | ICD-10-CM

## 2024-04-22 PROCEDURE — 99214 OFFICE O/P EST MOD 30 MIN: CPT | Performed by: PHYSICIAN ASSISTANT

## 2024-04-22 RX ORDER — BUPROPION HYDROCHLORIDE 150 MG/1
150 TABLET ORAL EVERY MORNING
Qty: 90 TABLET | Refills: 0 | Status: SHIPPED | OUTPATIENT
Start: 2024-05-19 | End: 2024-08-17

## 2024-04-22 NOTE — PROGRESS NOTES
"This provider is located at [address] [facility] [address], [address], [place] [address]. The Patient is seen remotely using [employer]. Patient is being seen via telehealth and confirm that they are in a secure environment for this session. The patient's condition being diagnosed/treated is appropriate for telemedicine. The provider identified herself as well as her credentials.   The patient gave consent to be seen remotely, and when consent is given they understand that the consent allows for patient identifiable information to be sent to a third party as needed.   They may refuse to be seen remotely at any time. The electronic data is encrypted and password protected, and the patient has been advised of the potential risks to privacy not withstanding such measures.    Patient is accepting of and agreeable to appointment.  The appointment consisted of the patient and I only.      Mode of visit: Video  Location of provider: [facility] [address], [place] [address].  Location of patient: Home  Does the patient consent to use a video/audio connection for your medical care today? Yes  The visit included audio and video interaction. No technical issues occurred during this visit.    Chief Complaint:  Depression, anxiety     History of Present Illness: Juliet Pittman is a 24 y.o. female who presents today for f/u of mood.  Patient has been taking Wellbutrin as prescribed and tolerating well without any complications.  Pt c/o depression that comes and goes, one week before menses, rates it a 3/10. No anhedonia. Pt has continued going to the gym. Pt has had some \"in general\" hopelessness. Patient denies having any current SI or HI at this time. No SI since last visit. Pt has some difficulty sleeping that varies. She has had some improvement with melatonin as needed. Pt c/o anxiety that comes and goes, occurs once a week, rates it a 7/10. Pt reports anxiety has been less frequent. She also c/o irritability only " associated with her anxiety. No palpitations.  Patient has noticed having more acne although states she struggled with this in the past and does not necessarily think this is related to Wellbutrin.  Patient has an appointment to see her PCP about this on Friday.    Medical Record Review: Reviewed office visit note from 1/23/23, Patient was initially started on Vilazodone for depression and anxiety. She had previously tried several other medications but felt that they were not effective. This medication caused her to breakout in hives. She has since stopped taking it and began taking Prozac that was previously prescribed to her by different provider. She would like to continue taking this medication. She has recently started a new job and is trying to go to the gym as well to help with her mental health. She has an upcoming appointment with psychiatry on 2/10/2023.       PHQ-9 Depression Screening  Little interest or pleasure in doing things? (P) 1-->several days   Feeling down, depressed, or hopeless? (P) 1-->several days   Trouble falling or staying asleep, or sleeping too much? (P) 1-->several days   Feeling tired or having little energy? (P) 1-->several days   Poor appetite or overeating? (P) 0-->not at all   Feeling bad about yourself - or that you are a failure or have let yourself or your family down? (P) 1-->several days   Trouble concentrating on things, such as reading the newspaper or watching television? (P) 1-->several days   Moving or speaking so slowly that other people could have noticed? Or the opposite - being so fidgety or restless that you have been moving around a lot more than usual? (P) 0-->not at all   Thoughts that you would be better off dead, or of hurting yourself in some way? (P) 0-->not at all   PHQ-9 Total Score (P) 6   If you checked off any problems, how difficult have these problems made it for you to do your work, take care of things at home, or get along with other people? (P)  somewhat difficult         GOLD-7  Over the last 2 weeks, how often have you been bothered by any of the following problems?  Feeling nervous, anxious, or on edge: Several days  Worrying too much about different things: Several days  Trouble relaxing: Not at all  Being so restless that it is hard to sit still: Not at all  Becoming easily annoyed or irritable: Several days  Feeling afraid as if something awful might happen: Several days        ROS:  Review of Systems   Constitutional:  Positive for fatigue. Negative for appetite change, diaphoresis and unexpected weight change.   HENT:  Negative for drooling, tinnitus and trouble swallowing.    Eyes:  Negative for visual disturbance.   Respiratory:  Negative for cough, chest tightness and shortness of breath.    Cardiovascular:  Negative for chest pain and palpitations.   Gastrointestinal:  Negative for abdominal pain, constipation, diarrhea, nausea and vomiting.   Endocrine: Negative for cold intolerance and heat intolerance.   Genitourinary:  Negative for difficulty urinating.   Musculoskeletal:  Negative for arthralgias and myalgias.   Skin:  Negative for rash.   Allergic/Immunologic: Negative for immunocompromised state.   Neurological:  Negative for dizziness, tremors, seizures and headaches.   Psychiatric/Behavioral:  Positive for agitation, dysphoric mood and sleep disturbance. Negative for hallucinations, self-injury and suicidal ideas. The patient is nervous/anxious.        Problem List:  Patient Active Problem List   Diagnosis    Generalized anxiety disorder       Current Medications:   Current Outpatient Medications   Medication Sig Dispense Refill    [START ON 5/19/2024] buPROPion XL (Wellbutrin XL) 150 MG 24 hr tablet Take 1 tablet by mouth Every Morning for 90 days. 90 tablet 0    Iron-Vitamin C (IRON 100/C PO) Take  by mouth.      melatonin 1 MG tablet Take  by mouth.      Probiotic Product (PROBIOTIC-10 PO) Take  by mouth.       No current  facility-administered medications for this visit.       Discontinued Medications:  Medications Discontinued During This Encounter   Medication Reason    buPROPion XL (Wellbutrin XL) 150 MG 24 hr tablet Reorder               Allergy:   Allergies   Allergen Reactions    Amoxicillin Unknown - Low Severity    Vilazodone Hives    Vilazodone Hcl Hives    Red Dye Rash        Past Medical History:  Past Medical History:   Diagnosis Date    Anxiety     Depression     PCOS (polycystic ovarian syndrome)        Past Surgical History:  No past surgical history on file.    Past Psychiatric History:  Began Treatment: 16 yr/o started therapy   Diagnoses: Depression, anxiety   Psychiatrist: Denies  Therapist: Pt has been in and out of therapy.   Admission History: Denies  Medications/Treatment: Lexapro (did well at first), Zoloft (decreased appetite), viibryd (hives), prozac, wellbutrin  Self Harm: Denies  Suicide Attempts: Denies  Postpartum depression: N/A    Family Psychiatric History:   Diagnoses: Her grandmother has a h/o autism and her father has a h/o depression.   Substance use: Denies  Suicide Attempts/Completions: Denies    Family History   Problem Relation Age of Onset    Diabetes Father     Depression Father        Substance Abuse History:   Alcohol use: Rare  Nicotine: Denies  Illicit Drug Use: Denies  Longest Period Sober: Denies  Rehab/AA/NA: Denies    Social History:  Living Situation: Pt lives with her mother.  Marital/Relationship History: Single  Children: Denies  Work History/Occupation: Pt works at the [employer].   Education: Pt completed high school, some college.    History: Denies  Legal: Denies    Social History     Socioeconomic History    Marital status: Single   Tobacco Use    Smoking status: Never     Passive exposure: Never    Smokeless tobacco: Never   Vaping Use    Vaping status: Never Used   Substance and Sexual Activity    Alcohol use: Never    Drug use: Never    Sexual activity: Not  "Currently     Partners: Male       Developmental History:   Place of birth: Pt was born in [place].   Siblings: Denies  Childhood: Pt reports mother was a single mom and was very broke growing up.       Physical Exam:  Physical Exam    Appearance: appears to be of stated age, maintains good eye contact.   Behavior: Appropriate, cooperative. No acute distress.  Motor: No abnormal movements  Speech: Coherent, spontaneous, appropriate with normal rate, volume, rhythm, and tone. Normal reaction time to questions. No hyperverbal or pressured speech.   Mood: \"I'm okay\"  Affect: Full range, appropriate, congruent with spontaneous emotional reactivity. Normal intensity. No emotional blunting, no change. Pt is pleasant.  Thought content: Negative suicidal ideations, negative homicidal ideations. Patient denies any obsession, compulsion, or phobia. No evidence of delusions.  Perceptions: Negative auditory hallucinations, negative visual hallucinations. Pt does not appear to be actively responding to internal stimuli.   Thought process: Logical, goal-directed, coherent, and linear with no evidence of flight of ideas, looseness of associations, thought blocking, circumstantiality, or tangentiality.   Insight/Judgement: Fair/fair  Cognition: Alert and oriented to person, place, and date. Memory intact for recent and remote events. Attention and concentration intact.     I have reexamined the patient and the results are consistent with the previously documented exam. Janet Wallace PA-C       Vital Signs:   There were no vitals taken for this visit.     Lab Results:   Admission on 10/02/2023, Discharged on 10/02/2023   Component Date Value Ref Range Status    Rapid Strep A Screen 10/02/2023 Negative   Final    Internal Control 10/02/2023 Passed   Final    Lot Number 10/02/2023 [id number]   Final    Expiration Date 10/02/2023 [id number]   Final       EKG Results:  No orders to display       Imaging Results:  No Images in the past " 120 days found..      Assessment & Plan   Diagnoses and all orders for this visit:    1. PMDD (premenstrual dysphoric disorder) (Primary)    2. Generalized anxiety disorder  -     buPROPion XL (Wellbutrin XL) 150 MG 24 hr tablet; Take 1 tablet by mouth Every Morning for 90 days.  Dispense: 90 tablet; Refill: 0    3. Mild episode of recurrent major depressive disorder  -     buPROPion XL (Wellbutrin XL) 150 MG 24 hr tablet; Take 1 tablet by mouth Every Morning for 90 days.  Dispense: 90 tablet; Refill: 0    4. Insomnia due to other mental disorder          Patient screened positive for depression based on a PHQ-9 score of 6 on 4/22/2024. Follow-up recommendations include: Prescribed antidepressant medication treatment, Suicide Risk Assessment performed, and see assessment below .    Presentation seems most consistent with MDD, GOLD, insomnia.  Patient declines changing medication and states she is happy with Wellbutrin.  Patient states she does not feel like she needs any med changes and that her mood is controlled at this time.  We will continue Wellbutrin for management of depression, anxiety, and overall mood.  Patient declines medication for sleep and would like to continue with melatonin.  Reiterated need for psychotherapy.  Instructed patient to contact the office for any new or worsening symptoms or any other concerns.  Follow-up in 1-2 months.  Addressed all questions and concerns.    Visit Diagnoses:    ICD-10-CM ICD-9-CM   1. PMDD (premenstrual dysphoric disorder)  F32.81 625.4   2. Generalized anxiety disorder  F41.1 300.02   3. Mild episode of recurrent major depressive disorder  F33.0 296.31   4. Insomnia due to other mental disorder  F51.05 300.9    F99 327.02           PLAN:  Safety: No acute safety concerns at this time.  Therapy: Will refer for psychotherapy to [facility]  Risk Assessment: Risk of self-harm acutely is moderate to severe.  Risk factors include anxiety disorder, mood disorder, intermittent SI  (passive, no plan or intent, no present SI), and recent psychosocial stressors (pandemic). Protective factors include no family history, denies access to guns/weapons, no history of suicide attempts or self-harm in the past, minimal AODA, healthcare seeking, future orientation, willingness to engage in care.  Risk of self-harm chronically is also moderate to severe, but could be further elevated in the event of treatment noncompliance and/or AODA.  Labs/Diagnostics Ordered:   No orders of the defined types were placed in this encounter.    Medications:   New Medications Ordered This Visit   Medications    buPROPion XL (Wellbutrin XL) 150 MG 24 hr tablet     Sig: Take 1 tablet by mouth Every Morning for 90 days.     Dispense:  90 tablet     Refill:  0       Discussed all risks, benefits, alternatives, and side effects of Bupropion including but not limited to GI upset (N/V/D, constipation), tachycardia, diaphoresis, weight loss, agitation, dizziness, headache, insomnia, tremor, blurred vision, anorexia, HTN, activation of more or hypomania, CNS stimulation and neuropsychiatric effects, ocular effects, seizure risk, withdrawal syndrome following abrupt discontinuation, and activation of suicidal ideation and behavior. Pt educated on the need to practice safe sex while taking this med. Discussed the need for pt to immediately call the office for any new or worsening symptoms, such as worsening depression; feeling nervous or restless; suicidal thoughts or actions; or other changes changes in mood or behavior, and all other concerns. Pt educated on med compliance. Pt verbalized understanding and is agreeable to taking Bupropion. Addressed all questions and concerns.     Follow up:   F/u in 1-2 months.    TREATMENT PLAN/GOALS: Continue supportive psychotherapy efforts and medications as indicated. Treatment and medication options discussed during today's visit. Patient ackowledged and verbally consented to continue with  current treatment plan and was educated on the importance of compliance with treatment and follow-up appointments.    MEDICATION ISSUES:  VANESSA reviewed as expected.  Discussed medication options and treatment plan of prescribed medication as well as the risks, benefits, and side effects including potential falls, possible impaired driving and metabolic adversities among others. Patient is agreeable to call the office with any worsening of symptoms or onset of side effects. Patient is agreeable to call 911 or go to the nearest ER should he/she begin having SI/HI. No medication side effects or related complaints today.            This document has been electronically signed by Janet Wallace PA-C  April 22, 2024 11:40 EDT      Part of this note may be an electronic transcription/translation of spoken language to printed text using the Dragon Dictation System.

## 2024-04-26 ENCOUNTER — OFFICE VISIT (OUTPATIENT)
Dept: FAMILY MEDICINE CLINIC | Facility: CLINIC | Age: 24
End: 2024-04-26
Payer: COMMERCIAL

## 2024-04-26 VITALS
BODY MASS INDEX: 21.16 KG/M2 | TEMPERATURE: 98.5 F | DIASTOLIC BLOOD PRESSURE: 62 MMHG | WEIGHT: 139.6 LBS | OXYGEN SATURATION: 98 % | HEART RATE: 103 BPM | HEIGHT: 68 IN | SYSTOLIC BLOOD PRESSURE: 104 MMHG

## 2024-04-26 DIAGNOSIS — E28.2 PCOS (POLYCYSTIC OVARIAN SYNDROME): ICD-10-CM

## 2024-04-26 DIAGNOSIS — Z00.00 ANNUAL PHYSICAL EXAM: Primary | ICD-10-CM

## 2024-04-26 DIAGNOSIS — Z30.09 BIRTH CONTROL COUNSELING: ICD-10-CM

## 2024-04-26 DIAGNOSIS — L70.0 ACNE VULGARIS: ICD-10-CM

## 2024-04-26 LAB
ALBUMIN SERPL-MCNC: 4.6 G/DL (ref 3.5–5.2)
ALBUMIN/GLOB SERPL: 2 G/DL
ALP SERPL-CCNC: 58 U/L (ref 39–117)
ALT SERPL W P-5'-P-CCNC: 9 U/L (ref 1–33)
ANION GAP SERPL CALCULATED.3IONS-SCNC: 9.6 MMOL/L (ref 5–15)
AST SERPL-CCNC: 14 U/L (ref 1–32)
B-HCG UR QL: NEGATIVE
BASOPHILS # BLD AUTO: 0.01 10*3/MM3 (ref 0–0.2)
BASOPHILS NFR BLD AUTO: 0.1 % (ref 0–1.5)
BILIRUB SERPL-MCNC: 0.2 MG/DL (ref 0–1.2)
BUN SERPL-MCNC: 12 MG/DL (ref 6–20)
BUN/CREAT SERPL: 15.8 (ref 7–25)
CALCIUM SPEC-SCNC: 9.5 MG/DL (ref 8.6–10.5)
CHLORIDE SERPL-SCNC: 105 MMOL/L (ref 98–107)
CHOLEST SERPL-MCNC: 153 MG/DL (ref 0–200)
CO2 SERPL-SCNC: 24.4 MMOL/L (ref 22–29)
CREAT SERPL-MCNC: 0.76 MG/DL (ref 0.57–1)
DEPRECATED RDW RBC AUTO: 41.6 FL (ref 37–54)
EGFRCR SERPLBLD CKD-EPI 2021: 113.8 ML/MIN/1.73
EOSINOPHIL # BLD AUTO: 0.01 10*3/MM3 (ref 0–0.4)
EOSINOPHIL NFR BLD AUTO: 0.1 % (ref 0.3–6.2)
ERYTHROCYTE [DISTWIDTH] IN BLOOD BY AUTOMATED COUNT: 11.8 % (ref 12.3–15.4)
EXPIRATION DATE: NORMAL
GLOBULIN UR ELPH-MCNC: 2.3 GM/DL
GLUCOSE SERPL-MCNC: 77 MG/DL (ref 65–99)
HCT VFR BLD AUTO: 38 % (ref 34–46.6)
HDLC SERPL-MCNC: 43 MG/DL (ref 40–60)
HGB BLD-MCNC: 12.7 G/DL (ref 12–15.9)
IMM GRANULOCYTES # BLD AUTO: 0.04 10*3/MM3 (ref 0–0.05)
IMM GRANULOCYTES NFR BLD AUTO: 0.4 % (ref 0–0.5)
INTERNAL NEGATIVE CONTROL: NORMAL
INTERNAL POSITIVE CONTROL: NORMAL
LDLC SERPL CALC-MCNC: 97 MG/DL (ref 0–100)
LDLC/HDLC SERPL: 2.24 {RATIO}
LYMPHOCYTES # BLD AUTO: 2.01 10*3/MM3 (ref 0.7–3.1)
LYMPHOCYTES NFR BLD AUTO: 22.3 % (ref 19.6–45.3)
Lab: NORMAL
MCH RBC QN AUTO: 32.2 PG (ref 26.6–33)
MCHC RBC AUTO-ENTMCNC: 33.4 G/DL (ref 31.5–35.7)
MCV RBC AUTO: 96.2 FL (ref 79–97)
MONOCYTES # BLD AUTO: 0.63 10*3/MM3 (ref 0.1–0.9)
MONOCYTES NFR BLD AUTO: 7 % (ref 5–12)
NEUTROPHILS NFR BLD AUTO: 6.32 10*3/MM3 (ref 1.7–7)
NEUTROPHILS NFR BLD AUTO: 70.1 % (ref 42.7–76)
NRBC BLD AUTO-RTO: 0 /100 WBC (ref 0–0.2)
PLATELET # BLD AUTO: 290 10*3/MM3 (ref 140–450)
PMV BLD AUTO: 11 FL (ref 6–12)
POTASSIUM SERPL-SCNC: 4 MMOL/L (ref 3.5–5.2)
PROT SERPL-MCNC: 6.9 G/DL (ref 6–8.5)
RBC # BLD AUTO: 3.95 10*6/MM3 (ref 3.77–5.28)
SODIUM SERPL-SCNC: 139 MMOL/L (ref 136–145)
TRIGL SERPL-MCNC: 68 MG/DL (ref 0–150)
TSH SERPL DL<=0.05 MIU/L-ACNC: 1.45 UIU/ML (ref 0.27–4.2)
VLDLC SERPL-MCNC: 13 MG/DL (ref 5–40)
WBC NRBC COR # BLD AUTO: 9.02 10*3/MM3 (ref 3.4–10.8)

## 2024-04-26 PROCEDURE — 80053 COMPREHEN METABOLIC PANEL: CPT

## 2024-04-26 PROCEDURE — 80061 LIPID PANEL: CPT

## 2024-04-26 PROCEDURE — 85025 COMPLETE CBC W/AUTO DIFF WBC: CPT

## 2024-04-26 PROCEDURE — 84443 ASSAY THYROID STIM HORMONE: CPT

## 2024-04-26 RX ORDER — DROSPIRENONE AND ETHINYL ESTRADIOL 0.02-3(28)
1 KIT ORAL DAILY
Qty: 28 TABLET | Refills: 12 | Status: SHIPPED | OUTPATIENT
Start: 2024-04-26

## 2024-04-26 NOTE — PROGRESS NOTES
Chief Complaint  Chief Complaint   Patient presents with    Acne       Subjective      Juliet Pittman presents to [facility]  History of Present Illness  The patient presents today to discuss acne treatment. She has a history of severe acne, specifically while she was in high school. She was prescribed Accutane and completed a course of Accutane medication, which did help for a while, but since 02/2024, her acne has returned and worsened over time.    The patient began experiencing acne during her high school years, which was managed with Accutane in 2018. Despite attempts at various treatments including antibiotics, topical treatments, and birth control, her acne persisted. Two years post-Accutane treatment, she discontinued birth control and experienced more frequent acne breakouts. Over the past year, she has been diagnosed with Polycystic Ovary Syndrome (PCOS), characterized by high testosterone and low vitamin D levels. In 02/2024, she experienced a severe flare-up of her acne, which affected her entire jawline on both sides. She has been informed that her acne is hormonal. In 2023, she consulted a dermatologist at [facility], but found the topical treatments to be ineffective. She has been attempting to balance her hormones naturally for the past 6 months, but is uncertain of its efficacy. Her acne is primarily located on her face and upper back. She has recently started  and overnight  at others' houses, and has eliminated all potential triggers from her diet.       Objective     Medical History:  Past Medical History:   Diagnosis Date    Anxiety     Depression     PCOS (polycystic ovarian syndrome)      History reviewed. No pertinent surgical history.   Social History     Tobacco Use    Smoking status: Never     Passive exposure: Never    Smokeless tobacco: Never   Vaping Use    Vaping status: Never Used   Substance Use Topics     "Alcohol use: Never    Drug use: Never     Family History   Problem Relation Age of Onset    Diabetes Father     Depression Father        Medications:  Prior to Admission medications    Medication Sig Start Date End Date Taking? Authorizing Provider   buPROPion XL (Wellbutrin XL) 150 MG 24 hr tablet Take 1 tablet by mouth Every Morning for 90 days. 5/19/24 8/17/24 Yes Janet Wallace PA-C   melatonin 1 MG tablet Take  by mouth.   Yes ProviderDavid MD   Iron-Vitamin C (IRON 100/C PO) Take  by mouth.  Patient not taking: Reported on 4/26/2024 4/26/24  David Barrera MD   Probiotic Product (PROBIOTIC-10 PO) Take  by mouth.  Patient not taking: Reported on 4/26/2024 4/26/24  David Barrera MD        Allergies:   Amoxicillin, Vilazodone, Vilazodone hcl, and Red dye    Health Maintenance Due   Topic Date Due    HPV VACCINES (1 - 3-dose series) Never done    TDAP/TD VACCINES (1 - Tdap) Never done    HEPATITIS C SCREENING  Never done    ANNUAL PHYSICAL  Never done    PAP SMEAR  Never done    COVID-19 Vaccine (1 - 2023-24 season) Never done         Vital Signs:   /62   Pulse 103   Temp 98.5 °F (36.9 °C)   Ht 172.7 cm (67.99\")   Wt 63.3 kg (139 lb 9.6 oz)   SpO2 98%   BMI 21.23 kg/m²     Wt Readings from Last 3 Encounters:   04/26/24 63.3 kg (139 lb 9.6 oz)   01/19/24 65.4 kg (144 lb 3.2 oz)   10/02/23 62.6 kg (138 lb)     BP Readings from Last 3 Encounters:   04/26/24 104/62   01/19/24 124/84   10/02/23 107/69       BMI is within normal parameters. No other follow-up for BMI required.       Physical Exam  Vitals reviewed.   Constitutional:       Appearance: Normal appearance. She is well-developed.   HENT:      Head: Normocephalic and atraumatic.   Eyes:      Conjunctiva/sclera: Conjunctivae normal.      Pupils: Pupils are equal, round, and reactive to light.   Cardiovascular:      Rate and Rhythm: Normal rate and regular rhythm.      Heart sounds: No murmur heard.     No friction rub. No " gallop.   Pulmonary:      Effort: Pulmonary effort is normal.      Breath sounds: Normal breath sounds. No wheezing or rhonchi.   Abdominal:      General: Bowel sounds are normal. There is no distension.      Palpations: Abdomen is soft.      Tenderness: There is no abdominal tenderness.   Skin:     General: Skin is warm and dry.      Findings: Acne present.      Comments: Severe acne to face, upper back and shoulders   Neurological:      Mental Status: She is alert and oriented to person, place, and time.      Cranial Nerves: No cranial nerve deficit.   Psychiatric:         Mood and Affect: Mood and affect normal.         Behavior: Behavior normal.         Thought Content: Thought content normal.         Judgment: Judgment normal.       Physical Exam        Result Review :    The following data was reviewed by ISABELLE Tomlin on 04/26/24 at 15:51 EDT:        No Images in the past 120 days found..    Results                 Assessment and Plan    Diagnoses and all orders for this visit:    1. Annual physical exam (Primary)  -     CBC & Differential  -     Comprehensive Metabolic Panel  -     Lipid Panel  -     TSH Rfx On Abnormal To Free T4    2. Acne vulgaris  -     Ambulatory Referral to Dermatology  -     drospirenone-ethinyl estradiol (YOGESH) 3-0.02 MG per tablet; Take 1 tablet by mouth Daily.  Dispense: 28 tablet; Refill: 12    3. Birth control counseling  -     drospirenone-ethinyl estradiol (YOGESH) 3-0.02 MG per tablet; Take 1 tablet by mouth Daily.  Dispense: 28 tablet; Refill: 12  -     POCT pregnancy, urine    4. PCOS (polycystic ovarian syndrome)  -     drospirenone-ethinyl estradiol (YOGESH) 3-0.02 MG per tablet; Take 1 tablet by mouth Daily.  Dispense: 28 tablet; Refill: 12       Assessment & Plan  1. Acne.  A prescription for hormonal contraceptive, Yogesh, has been issued. Additionally, a referral to dermatology has been initiated.    2. Contraception management.  A comprehensive discussion was held  regarding the various contraceptive methods, including Nexplanon, Depo-Provera. A prescription for Jannet has been issued.       Discussed with patient indication for routine Pap smear for cervical cancer screening.  We also discussed immunizations including HPV, Tdap the patient declines any immunizations at this time.        Smoking Cessation:    Juliet Pittman  reports that she has never smoked. She has never been exposed to tobacco smoke. She has never used smokeless tobacco.        Follow Up   Return in about 3 months (around 7/26/2024) for Annual physical.  Patient was given instructions and counseling regarding her condition or for health maintenance advice. Please see specific information pulled into the AVS if appropriate.     Please note that portions of this note were completed with a voice recognition program.    Patient or patient representative verbalized consent for the use of Ambient Listening during the visit with  ISABELLE Tomlin for chart documentation. 4/26/2024  14:56 EDT

## 2024-05-28 ENCOUNTER — TELEMEDICINE (OUTPATIENT)
Dept: BEHAVIORAL HEALTH | Facility: CLINIC | Age: 24
End: 2024-05-28
Payer: COMMERCIAL

## 2024-05-28 DIAGNOSIS — F51.05 INSOMNIA DUE TO OTHER MENTAL DISORDER: ICD-10-CM

## 2024-05-28 DIAGNOSIS — F41.1 GENERALIZED ANXIETY DISORDER: Primary | ICD-10-CM

## 2024-05-28 DIAGNOSIS — F33.0 MILD EPISODE OF RECURRENT MAJOR DEPRESSIVE DISORDER: ICD-10-CM

## 2024-05-28 DIAGNOSIS — R00.2 PALPITATIONS: ICD-10-CM

## 2024-05-28 DIAGNOSIS — F99 INSOMNIA DUE TO OTHER MENTAL DISORDER: ICD-10-CM

## 2024-05-28 PROCEDURE — 99214 OFFICE O/P EST MOD 30 MIN: CPT | Performed by: PHYSICIAN ASSISTANT

## 2024-05-28 NOTE — PROGRESS NOTES
"This provider is located at [address], [address], [place] [address]. The Patient is seen remotely using [facility]. Patient is being seen via telehealth and confirm that they are in a secure environment for this session. The patient's condition being diagnosed/treated is appropriate for telemedicine. The provider identified herself as well as her credentials.   The patient gave consent to be seen remotely, and when consent is given they understand that the consent allows for patient identifiable information to be sent to a third party as needed.   They may refuse to be seen remotely at any time. The electronic data is encrypted and password protected, and the patient has been advised of the potential risks to privacy not withstanding such measures.    Patient is accepting of and agreeable to appointment.  The appointment consisted of the patient and I only.      Mode of visit: Video  Location of provider: [address], [place] [address].  Location of patient: Home  Does the patient consent to use a video/audio connection for your medical care today? Yes  The visit included audio and video interaction. No technical issues occurred during this visit.    Chief Complaint:  Depression, anxiety     History of Present Illness: Juliet Pittman is a 24 y.o. female who presents today for f/u of mood.  Patient has been taking Wellbutrin as prescribed and tolerating well without any complications. Pt was recently started on Jannet by PCP one month ago. H/o PCOS. Pt c/o depression that comes and goes, one week before menses, rates it a 3/10. No anhedonia. Pt has had some \"in general\" hopelessness, no change. Patient denies having any current SI or HI at this time. No SI since last visit. Pt has some difficulty sleeping that varies, although is better with melatonin. Pt admits to poor sleep hygiene. She has had some improvement with melatonin as needed. Pt c/o anxiety that comes and goes, " occurs once a week, rates it a 7/10. She also c/o irritability only associated with her anxiety. Pt has continued going to the gym, notices palpitations when she is doing cardio or working out.  Patient plans on schedule appointment with her PCP to discuss this.    Medical Record Review: Reviewed office visit note from 1/23/23, Patient was initially started on Vilazodone for depression and anxiety. She had previously tried several other medications but felt that they were not effective. This medication caused her to breakout in hives. She has since stopped taking it and began taking Prozac that was previously prescribed to her by different provider. She would like to continue taking this medication. She has recently started a new job and is trying to go to the gym as well to help with her mental health. She has an upcoming appointment with psychiatry on 2/10/2023.       PHQ-9 Depression Screening:  Little interest or pleasure in doing things?     Feeling down, depressed, or hopeless?     Trouble falling or staying asleep, or sleeping too much?     Feeling tired or having little energy?     Poor appetite or overeating?     Feeling bad about yourself - or that you are a failure or have let yourself or your family down?     Trouble concentrating on things, such as reading the newspaper or watching television?     Moving or speaking so slowly that other people could have noticed? Or the opposite - being so fidgety or restless that you have been moving around a lot more than usual?     Thoughts that you would be better off dead, or of hurting yourself in some way?     PHQ-9 Total Score     If you checked off any problems, how difficult have these problems made it for you to do your work, take care of things at home, or get along with other people?           GOLD-7           ROS:  Review of Systems   Constitutional:  Positive for fatigue. Negative for appetite change, diaphoresis and unexpected weight change.   HENT:   Negative for drooling, tinnitus and trouble swallowing.    Eyes:  Negative for visual disturbance.   Respiratory:  Negative for cough, chest tightness and shortness of breath.    Cardiovascular:  Positive for palpitations. Negative for chest pain.   Gastrointestinal:  Negative for abdominal pain, constipation, diarrhea, nausea and vomiting.   Endocrine: Negative for cold intolerance and heat intolerance.   Genitourinary:  Negative for difficulty urinating.   Musculoskeletal:  Negative for arthralgias and myalgias.   Skin:  Negative for rash.   Allergic/Immunologic: Negative for immunocompromised state.   Neurological:  Negative for dizziness, tremors, seizures and headaches.   Psychiatric/Behavioral:  Positive for agitation, dysphoric mood and sleep disturbance. Negative for hallucinations, self-injury and suicidal ideas. The patient is nervous/anxious.        Problem List:  Patient Active Problem List   Diagnosis    Generalized anxiety disorder       Current Medications:   Current Outpatient Medications   Medication Sig Dispense Refill    buPROPion XL (Wellbutrin XL) 150 MG 24 hr tablet Take 1 tablet by mouth Every Morning for 90 days. 90 tablet 0    drospirenone-ethinyl estradiol (YOGESH) 3-0.02 MG per tablet Take 1 tablet by mouth Daily. 28 tablet 12    melatonin 1 MG tablet Take  by mouth.       No current facility-administered medications for this visit.       Discontinued Medications:  There are no discontinued medications.              Allergy:   Allergies   Allergen Reactions    Amoxicillin Unknown - Low Severity    Vilazodone Hives    Vilazodone Hcl Hives    Red Dye Rash        Past Medical History:  Past Medical History:   Diagnosis Date    Anxiety     Depression     PCOS (polycystic ovarian syndrome)        Past Surgical History:  No past surgical history on file.    Past Psychiatric History:  Began Treatment: 16 yr/o started therapy   Diagnoses: Depression, anxiety   Psychiatrist: Denies  Therapist: Pt has  "been in and out of therapy.   Admission History: Denies  Medications/Treatment: Lexapro (did well at first), Zoloft (decreased appetite), viibryd (hives), prozac, wellbutrin  Self Harm: Denies  Suicide Attempts: Denies  Postpartum depression: N/A    Family Psychiatric History:   Diagnoses: Her grandmother has a h/o autism and her father has a h/o depression.   Substance use: Denies  Suicide Attempts/Completions: Denies    Family History   Problem Relation Age of Onset    Diabetes Father     Depression Father        Substance Abuse History:   Alcohol use: Rare  Nicotine: Denies  Illicit Drug Use: Denies  Longest Period Sober: Denies  Rehab/AA/NA: Denies    Social History:  Living Situation: Pt lives with her mother.  Marital/Relationship History: Single  Children: Denies  Work History/Occupation: Pt works at the [employer].   Education: Pt completed high school, some college.    History: Denies  Legal: Denies    Social History     Socioeconomic History    Marital status: Single   Tobacco Use    Smoking status: Never     Passive exposure: Never    Smokeless tobacco: Never   Vaping Use    Vaping status: Never Used   Substance and Sexual Activity    Alcohol use: Never    Drug use: Never    Sexual activity: Not Currently     Partners: Male       Developmental History:   Place of birth: Pt was born in [place].   Siblings: Denies  Childhood: Pt reports mother was a single mom and was very broke growing up.       Physical Exam:  Physical Exam    Appearance: appears to be of stated age, maintains good eye contact.   Behavior: Appropriate, cooperative. No acute distress.  Motor: No abnormal movements  Speech: Coherent, spontaneous, appropriate with normal rate, volume, rhythm, and tone. Normal reaction time to questions. No hyperverbal or pressured speech.   Mood: \"I'm okay\"  Affect: Full range, appropriate, congruent with spontaneous emotional reactivity. Normal intensity. No emotional blunting, no change. Pt is " pleasant.  Thought content: Negative suicidal ideations, negative homicidal ideations. Patient denies any obsession, compulsion, or phobia. No evidence of delusions.  Perceptions: Negative auditory hallucinations, negative visual hallucinations. Pt does not appear to be actively responding to internal stimuli.   Thought process: Logical, goal-directed, coherent, and linear with no evidence of flight of ideas, looseness of associations, thought blocking, circumstantiality, or tangentiality.   Insight/Judgement: Fair/fair  Cognition: Alert and oriented to person, place, and date. Memory intact for recent and remote events. Attention and concentration intact.     I have reexamined the patient and the results are consistent with the previously documented exam. Janet Wallace PA-C       Vital Signs:   There were no vitals taken for this visit.     Lab Results:   Office Visit on 04/26/2024   Component Date Value Ref Range Status    Glucose 04/26/2024 77  65 - 99 mg/dL Final    BUN 04/26/2024 12  6 - 20 mg/dL Final    Creatinine 04/26/2024 0.76  0.57 - 1.00 mg/dL Final    Sodium 04/26/2024 139  136 - 145 mmol/L Final    Potassium 04/26/2024 4.0  3.5 - 5.2 mmol/L Final    Chloride 04/26/2024 105  98 - 107 mmol/L Final    CO2 04/26/2024 24.4  22.0 - 29.0 mmol/L Final    Calcium 04/26/2024 9.5  8.6 - 10.5 mg/dL Final    Total Protein 04/26/2024 6.9  6.0 - 8.5 g/dL Final    Albumin 04/26/2024 4.6  3.5 - 5.2 g/dL Final    ALT (SGPT) 04/26/2024 9  1 - 33 U/L Final    AST (SGOT) 04/26/2024 14  1 - 32 U/L Final    Alkaline Phosphatase 04/26/2024 58  39 - 117 U/L Final    Total Bilirubin 04/26/2024 0.2  0.0 - 1.2 mg/dL Final    Globulin 04/26/2024 2.3  gm/dL Final    A/G Ratio 04/26/2024 2.0  g/dL Final    BUN/Creatinine Ratio 04/26/2024 15.8  7.0 - 25.0 Final    Anion Gap 04/26/2024 9.6  5.0 - 15.0 mmol/L Final    eGFR 04/26/2024 113.8  >60.0 mL/min/1.73 Final    Total Cholesterol 04/26/2024 153  0 - 200 mg/dL Final     Triglycerides 04/26/2024 68  0 - 150 mg/dL Final    HDL Cholesterol 04/26/2024 43  40 - 60 mg/dL Final    LDL Cholesterol  04/26/2024 97  0 - 100 mg/dL Final    VLDL Cholesterol 04/26/2024 13  5 - 40 mg/dL Final    LDL/HDL Ratio 04/26/2024 2.24   Final    TSH 04/26/2024 1.450  0.270 - 4.200 uIU/mL Final    HCG, Urine, QL 04/26/2024 Negative  Negative Final    Lot Number 04/26/2024 [id number]   Final    Internal Positive Control 04/26/2024 Passed  Positive, Passed Final    Internal Negative Control 04/26/2024 Passed  Negative, Passed Final    Expiration Date 04/26/2024 04/08/2025   Final    WBC 04/26/2024 9.02  3.40 - 10.80 10*3/mm3 Final    RBC 04/26/2024 3.95  3.77 - 5.28 10*6/mm3 Final    Hemoglobin 04/26/2024 12.7  12.0 - 15.9 g/dL Final    Hematocrit 04/26/2024 38.0  34.0 - 46.6 % Final    MCV 04/26/2024 96.2  79.0 - 97.0 fL Final    MCH 04/26/2024 32.2  26.6 - 33.0 pg Final    MCHC 04/26/2024 33.4  31.5 - 35.7 g/dL Final    RDW 04/26/2024 11.8 (L)  12.3 - 15.4 % Final    RDW-SD 04/26/2024 41.6  37.0 - 54.0 fl Final    MPV 04/26/2024 11.0  6.0 - 12.0 fL Final    Platelets 04/26/2024 290  140 - 450 10*3/mm3 Final    Neutrophil % 04/26/2024 70.1  42.7 - 76.0 % Final    Lymphocyte % 04/26/2024 22.3  19.6 - 45.3 % Final    Monocyte % 04/26/2024 7.0  5.0 - 12.0 % Final    Eosinophil % 04/26/2024 0.1 (L)  0.3 - 6.2 % Final    Basophil % 04/26/2024 0.1  0.0 - 1.5 % Final    Immature Grans % 04/26/2024 0.4  0.0 - 0.5 % Final    Neutrophils, Absolute 04/26/2024 6.32  1.70 - 7.00 10*3/mm3 Final    Lymphocytes, Absolute 04/26/2024 2.01  0.70 - 3.10 10*3/mm3 Final    Monocytes, Absolute 04/26/2024 0.63  0.10 - 0.90 10*3/mm3 Final    Eosinophils, Absolute 04/26/2024 0.01  0.00 - 0.40 10*3/mm3 Final    Basophils, Absolute 04/26/2024 0.01  0.00 - 0.20 10*3/mm3 Final    Immature Grans, Absolute 04/26/2024 0.04  0.00 - 0.05 10*3/mm3 Final    nRBC 04/26/2024 0.0  0.0 - 0.2 /100 WBC Final   Admission on 10/02/2023, Discharged on  10/02/2023   Component Date Value Ref Range Status    Rapid Strep A Screen 10/02/2023 Negative   Final    Internal Control 10/02/2023 Passed   Final    Lot Number 10/02/2023 [id number]   Final    Expiration Date 10/02/2023 12,[id number]   Final       EKG Results:  ECG 12 Lead    (Results Pending)       Imaging Results:  No Images in the past 120 days found..      Assessment & Plan   Diagnoses and all orders for this visit:    1. Generalized anxiety disorder (Primary)    2. Mild episode of recurrent major depressive disorder    3. Insomnia due to other mental disorder    4. Palpitations  -     ECG 12 Lead          Patient screened positive for depression based on a PHQ-9 score of 6 on 4/22/2024. Follow-up recommendations include: Prescribed antidepressant medication treatment, Suicide Risk Assessment performed, and see assessment below .    Presentation seems most consistent with MDD, GOLD, insomnia, palpitations.  Discussed the need to follow-up with PCP about reported palpitations.  Will order EKG.  We will continue Wellbutrin for management depression, anxiety, and overall mood. Patient declines medication for sleep and would like to continue with melatonin.  Reiterated need for psychotherapy.  Instructed patient to contact the office for any new or worsening symptoms or any other concerns.  Follow-up in 1 month.  Addressed all questions and concerns.    Visit Diagnoses:    ICD-10-CM ICD-9-CM   1. Generalized anxiety disorder  F41.1 300.02   2. Mild episode of recurrent major depressive disorder  F33.0 296.31   3. Insomnia due to other mental disorder  F51.05 300.9    F99 327.02   4. Palpitations  R00.2 785.1           PLAN:  Safety: No acute safety concerns at this time.  Therapy: Will refer for psychotherapy to [facility]  Risk Assessment: Risk of self-harm acutely is moderate to severe.  Risk factors include anxiety disorder, mood disorder, intermittent SI (passive, no plan or intent, no present SI), and recent psychosocial  stressors (pandemic). Protective factors include no family history, denies access to guns/weapons, no history of suicide attempts or self-harm in the past, minimal AODA, healthcare seeking, future orientation, willingness to engage in care.  Risk of self-harm chronically is also moderate to severe, but could be further elevated in the event of treatment noncompliance and/or AODA.  Labs/Diagnostics Ordered:   Orders Placed This Encounter   Procedures    ECG 12 Lead     Medications:   No orders of the defined types were placed in this encounter.      Discussed all risks, benefits, alternatives, and side effects of Bupropion including but not limited to GI upset (N/V/D, constipation), tachycardia, diaphoresis, weight loss, agitation, dizziness, headache, insomnia, tremor, blurred vision, anorexia, HTN, activation of more or hypomania, CNS stimulation and neuropsychiatric effects, ocular effects, seizure risk, withdrawal syndrome following abrupt discontinuation, and activation of suicidal ideation and behavior. Pt educated on the need to practice safe sex while taking this med. Discussed the need for pt to immediately call the office for any new or worsening symptoms, such as worsening depression; feeling nervous or restless; suicidal thoughts or actions; or other changes changes in mood or behavior, and all other concerns. Pt educated on med compliance. Pt verbalized understanding and is agreeable to taking Bupropion. Addressed all questions and concerns.     Follow up:   F/u in 1 month.    TREATMENT PLAN/GOALS: Continue supportive psychotherapy efforts and medications as indicated. Treatment and medication options discussed during today's visit. Patient ackowledged and verbally consented to continue with current treatment plan and was educated on the importance of compliance with treatment and follow-up appointments.    MEDICATION ISSUES:  VANESSA reviewed as expected.  Discussed medication options and treatment plan  of prescribed medication as well as the risks, benefits, and side effects including potential falls, possible impaired driving and metabolic adversities among others. Patient is agreeable to call the office with any worsening of symptoms or onset of side effects. Patient is agreeable to call 911 or go to the nearest ER should he/she begin having SI/HI. No medication side effects or related complaints today.            This document has been electronically signed by Janet Wallace PA-C  May 28, 2024 16:29 EDT      Part of this note may be an electronic transcription/translation of spoken language to printed text using the Dragon Dictation System.

## 2024-05-29 ENCOUNTER — HOSPITAL ENCOUNTER (OUTPATIENT)
Dept: CARDIOLOGY | Facility: HOSPITAL | Age: 24
Discharge: HOME | End: 2024-05-29
Admitting: PHYSICIAN ASSISTANT
Payer: COMMERCIAL

## 2024-05-29 ENCOUNTER — TRANSCRIBE ORDERS (OUTPATIENT)
Dept: ADMINISTRATIVE | Facility: HOSPITAL | Age: 24
End: 2024-05-29
Payer: COMMERCIAL

## 2024-05-29 DIAGNOSIS — R00.2 PALPITATIONS: Primary | ICD-10-CM

## 2024-05-29 DIAGNOSIS — R00.2 PALPITATIONS: ICD-10-CM

## 2024-05-29 LAB
QT INTERVAL: 372 MS
QTC INTERVAL: 393 MS

## 2024-05-29 PROCEDURE — 93005 ELECTROCARDIOGRAM TRACING: CPT | Performed by: PHYSICIAN ASSISTANT

## 2024-06-04 ENCOUNTER — TELEPHONE (OUTPATIENT)
Dept: PSYCHIATRY | Facility: CLINIC | Age: 24
End: 2024-06-04
Payer: COMMERCIAL

## 2024-06-04 DIAGNOSIS — F33.1 MODERATE EPISODE OF RECURRENT MAJOR DEPRESSIVE DISORDER: ICD-10-CM

## 2024-06-04 DIAGNOSIS — R94.31 ABNORMAL EKG: Primary | ICD-10-CM

## 2024-06-04 DIAGNOSIS — F41.1 GENERALIZED ANXIETY DISORDER: ICD-10-CM

## 2024-06-04 NOTE — TELEPHONE ENCOUNTER
Called pt and left VM to call office back. Will discuss abnormal EKG results with patient and concern about arrhythmia. Will be discontinuing wellbutrin in case this is a contributing factor. PCP Rocío WALTON has been notified and she placed an order for a Holter monitor. I will place a referral to cardiology after discussing this with the patient.

## 2024-06-06 NOTE — TELEPHONE ENCOUNTER
Called pt, left voicemail to call office back.    Spoke with patient's mother and stressed the importance of me speaking with patient.  Patient tends to not answer her phone calls if she does not know the number.  Instructed patient's mother for patient to stop taking Wellbutrin in case this may be a contributing factor to arrhythmia that was shown on EKG.  Discussed needing to speak with patient for going over medication options.  Will refer to cardiology.  Addressed all questions and concerns.  Will contact the patient later today or tomorrow after mother gives patient this message.

## 2024-06-07 RX ORDER — DULOXETIN HYDROCHLORIDE 20 MG/1
20 CAPSULE, DELAYED RELEASE ORAL NIGHTLY
Qty: 30 CAPSULE | Refills: 1 | Status: SHIPPED | OUTPATIENT
Start: 2024-06-07

## 2024-06-07 NOTE — TELEPHONE ENCOUNTER
Spoke with patient and instructed her to discontinue Wellbutrin.  We will start on Cymbalta for management depression, anxiety, and overall mood.  Discussed plan of referring to cardiology for arrhythmia on EKG.  Instructed patient to visit the ER urgent care for any new or worsening symptoms or any other concerns.  Patient verbalized understanding and is agreeable to this plan.  Addressed all questions and concerns.    Discussed all risks, benefits, alternatives, and side effects of Duloxetine including but not limited to GI upset, sexual dysfunction, bleeding risk, seizure risk, weight loss, insomnia, diaphoresis, drowsiness, headache, dizziness, fatigue, activation of more or hypomania, increased fragility fracture risk, hyponatremia, increased BP, hepatotoxicity, ocular effects, withdrawal syndrome following abrupt discontinuation, serotonin syndrome, and activation of suicidal ideation and behavior.  Pt educated on the need to practice safe sex while taking this med. Discussed the need for pt to immediately call the office for any new or worsening symptoms, such as worsening depression; feeling nervous or restless; suicidal thoughts or actions; or other changes changes in mood or behavior, and all other concerns. Pt educated on med compliance and the risks of suddenly stopping this medication or missing doses. Pt verbalized understanding and is agreeable to taking Duloxetine. Addressed all questions and concerns.

## 2024-07-16 ENCOUNTER — OFFICE VISIT (OUTPATIENT)
Dept: CARDIOLOGY | Facility: CLINIC | Age: 24
End: 2024-07-16
Payer: COMMERCIAL

## 2024-07-16 VITALS
DIASTOLIC BLOOD PRESSURE: 69 MMHG | HEIGHT: 67 IN | HEART RATE: 94 BPM | SYSTOLIC BLOOD PRESSURE: 101 MMHG | BODY MASS INDEX: 22.13 KG/M2 | WEIGHT: 141 LBS

## 2024-07-16 DIAGNOSIS — R00.2 PALPITATIONS: ICD-10-CM

## 2024-07-16 DIAGNOSIS — R94.31 ABNORMAL EKG: Primary | ICD-10-CM

## 2024-07-16 PROCEDURE — 99204 OFFICE O/P NEW MOD 45 MIN: CPT | Performed by: INTERNAL MEDICINE

## 2024-07-16 RX ORDER — SULFAMETHOXAZOLE AND TRIMETHOPRIM 800; 160 MG/1; MG/1
1 TABLET ORAL
COMMUNITY
Start: 2024-06-30

## 2024-07-16 NOTE — PROGRESS NOTES
"Chief Complaint  Abnormal ECG and Establish Care    Subjective        Juliet Pittman presents to [facility] CARDIOLOGY  History of present illness:    Patient is a 24-year-old female with history of depression and anxiety.  Patient states she presents that she was told she had an abnormal EKG and she does note accelerated heart rates.  She states she works out regularly at the gym but \"avoids cardio.\"  She states 1 time she got on the treadmill and her heart rate quickly went up to 178 bpm.  She can also note that when she is laying down her heart can race.  She states this happens about 1 time per week.  She does drink a lot of water and rare caffeine.  She notes no tobacco or alcohol use.  Mother and father have no heart disease.      Past Medical History:   Diagnosis Date    Anxiety     Depression     PCOS (polycystic ovarian syndrome)          History reviewed. No pertinent surgical history.       Social History     Socioeconomic History    Marital status: Single   Tobacco Use    Smoking status: Never     Passive exposure: Never    Smokeless tobacco: Never   Vaping Use    Vaping status: Never Used   Substance and Sexual Activity    Alcohol use: Never    Drug use: Never    Sexual activity: Not Currently     Partners: Male         Family History   Problem Relation Age of Onset    Diabetes Father     Depression Father           Allergies   Allergen Reactions    Amoxicillin Unknown - Low Severity    Vilazodone Hives    Vilazodone Hcl Hives    Red Dye Rash            Current Outpatient Medications:     drospirenone-ethinyl estradiol (YOGESH) 3-0.02 MG per tablet, Take 1 tablet by mouth Daily., Disp: 28 tablet, Rfl: 12    melatonin 1 MG tablet, Take  by mouth., Disp: , Rfl:     sulfamethoxazole-trimethoprim (BACTRIM DS,SEPTRA DS) 800-160 MG per tablet, Take 1 tablet by mouth., Disp: , Rfl:       ROS:  Cardiac review of systems positive for palpitations.    Objective     /69   Pulse 94   Ht 170.2 cm " "(67\")   Wt 64 kg (141 lb)   BMI 22.08 kg/m²       General Appearance:   well developed  well nourished  HENT:   oropharynx moist  lips not cyanotic  Respiratory:  no respiratory distress  normal breath sounds  no rales  Cardiovascular:  no jugular venous distention  regular rhythm  S1 normal, S2 normal  no S3, no S4   no murmur  no rub, no thrill  No carotid bruit  pedal pulses normal  lower extremity edema: none    Musculoskeletal:  no clubbing of fingers.   normocephalic, head atraumatic  Skin:   warm, dry  Psychiatric:  judgement and insight appropriate  normal mood and affect    ECHO:    STRESS:    CATH:  No results found for this or any previous visit.    BMP:     Glucose   Date Value Ref Range Status   04/26/2024 77 65 - 99 mg/dL Final     BUN   Date Value Ref Range Status   04/26/2024 12 6 - 20 mg/dL Final     Creatinine   Date Value Ref Range Status   04/26/2024 0.76 0.57 - 1.00 mg/dL Final     Sodium   Date Value Ref Range Status   04/26/2024 139 136 - 145 mmol/L Final     Potassium   Date Value Ref Range Status   04/26/2024 4.0 3.5 - 5.2 mmol/L Final     Chloride   Date Value Ref Range Status   04/26/2024 105 98 - 107 mmol/L Final     CO2   Date Value Ref Range Status   04/26/2024 24.4 22.0 - 29.0 mmol/L Final     Calcium   Date Value Ref Range Status   04/26/2024 9.5 8.6 - 10.5 mg/dL Final     BUN/Creatinine Ratio   Date Value Ref Range Status   04/26/2024 15.8 7.0 - 25.0 Final     Anion Gap   Date Value Ref Range Status   04/26/2024 9.6 5.0 - 15.0 mmol/L Final     eGFR   Date Value Ref Range Status   04/26/2024 113.8 >60.0 mL/min/1.73 Final     LIPIDS:  Total Cholesterol   Date Value Ref Range Status   04/26/2024 153 0 - 200 mg/dL Final     Triglycerides   Date Value Ref Range Status   04/26/2024 68 0 - 150 mg/dL Final     HDL Cholesterol   Date Value Ref Range Status   04/26/2024 43 40 - 60 mg/dL Final     LDL Cholesterol    Date Value Ref Range Status   04/26/2024 97 0 - 100 mg/dL Final     VLDL " Cholesterol   Date Value Ref Range Status   04/26/2024 13 5 - 40 mg/dL Final     LDL/HDL Ratio   Date Value Ref Range Status   04/26/2024 2.24  Final         Procedures             ASSESSMENT:  Diagnoses and all orders for this visit:    1. Abnormal EKG (Primary)    2. Palpitations         PLAN:    1.  Patient's EKG shows sinus arrhythmia.  This is a completely normal finding in a young patient.  There are no significant abnormalities.  2.  I did explain to patient that her max heart rate would be close to 200 bpm with exercising.  3.  We also discussed the more cardio people do the longer it takes for the heart rate to come up.  4.  We will plan on placing a 2-week event recorder just to make sure she does not have any abnormal heart rhythms.  She does note some episodes where she is just laying down and her heart seems to race.  5.  Patient is off her anxiety medication as they noted it could cause an increase in heart rate.  If she needed something in the future for anxiety propranolol might be a good medication and we discussed this.      Return in about 6 weeks (around 8/27/2024) for bhupinder abdi.     Patient was given instructions and counseling regarding her condition or for health maintenance advice. Please see specific information pulled into the AVS if appropriate.         Casey Salazar MD   7/16/2024  19:32 EDT

## 2024-07-18 ENCOUNTER — OFFICE VISIT (OUTPATIENT)
Dept: BEHAVIORAL HEALTH | Facility: CLINIC | Age: 24
End: 2024-07-18
Payer: COMMERCIAL

## 2024-07-18 VITALS
SYSTOLIC BLOOD PRESSURE: 110 MMHG | BODY MASS INDEX: 21.19 KG/M2 | WEIGHT: 135 LBS | HEART RATE: 115 BPM | DIASTOLIC BLOOD PRESSURE: 58 MMHG | HEIGHT: 67 IN

## 2024-07-18 DIAGNOSIS — F99 INSOMNIA DUE TO OTHER MENTAL DISORDER: ICD-10-CM

## 2024-07-18 DIAGNOSIS — F41.1 GENERALIZED ANXIETY DISORDER: Primary | ICD-10-CM

## 2024-07-18 DIAGNOSIS — F51.05 INSOMNIA DUE TO OTHER MENTAL DISORDER: ICD-10-CM

## 2024-07-18 DIAGNOSIS — F33.1 MODERATE EPISODE OF RECURRENT MAJOR DEPRESSIVE DISORDER: ICD-10-CM

## 2024-07-18 DIAGNOSIS — F32.81 PMDD (PREMENSTRUAL DYSPHORIC DISORDER): ICD-10-CM

## 2024-07-18 NOTE — PROGRESS NOTES
Chief Complaint:  Depression, anxiety     History of Present Illness: Juliet Pittman is a 24 y.o. female who presents today for f/u of mood. Pt stopped Wellbutrin but has not started Cymbalta yet. Pt has had an increase in anxiety and irritability since being off Wellbutrin.  Patient has not started Cymbalta yet as she wanted to see where her baseline was at and is also reluctant about being on medications.  Patient is interested in therapy for managing her mood at this time. Pt denies feeling depressed. She has been feeling more emotional and crying, notes she is about to start her menses this week. Pt has had more stressors, including interpersonal relationships. Patient denies having any current SI or HI at this time. Pt will have intermittent SI that occurs a few times a week and is passive. Pt denies having any plan or intent. Pt has some difficulty sleeping that varies, although is better with melatonin. Pt admits to poor sleep hygiene. She has had some improvement with melatonin as needed.  Patient recently saw cardiology and is wearing a Holter monitor.    Medical Record Review: Reviewed office visit note from 1/23/23, Patient was initially started on Vilazodone for depression and anxiety. She had previously tried several other medications but felt that they were not effective. This medication caused her to breakout in hives. She has since stopped taking it and began taking Prozac that was previously prescribed to her by different provider. She would like to continue taking this medication. She has recently started a new job and is trying to go to the gym as well to help with her mental health. She has an upcoming appointment with psychiatry on 2/10/2023.         Reviewed cardiology note from 7/16/24, reviewed assessment - 1.  Patient's EKG shows sinus arrhythmia.  This is a completely normal finding in a young patient.  There are no significant abnormalities.  2.  I did explain to patient that her max  heart rate would be close to 200 bpm with exercising.  3.  We also discussed the more cardio people do the longer it takes for the heart rate to come up.  4.  We will plan on placing a 2-week event recorder just to make sure she does not have any abnormal heart rhythms.  She does note some episodes where she is just laying down and her heart seems to race.  5.  Patient is off her anxiety medication as they noted it could cause an increase in heart rate.  If she needed something in the future for anxiety propranolol might be a good medication and we discussed this.         PHQ-9 Depression Screening:  Little interest or pleasure in doing things?     Feeling down, depressed, or hopeless?     Trouble falling or staying asleep, or sleeping too much?     Feeling tired or having little energy?     Poor appetite or overeating?     Feeling bad about yourself - or that you are a failure or have let yourself or your family down?     Trouble concentrating on things, such as reading the newspaper or watching television?     Moving or speaking so slowly that other people could have noticed? Or the opposite - being so fidgety or restless that you have been moving around a lot more than usual?     Thoughts that you would be better off dead, or of hurting yourself in some way?     PHQ-9 Total Score     If you checked off any problems, how difficult have these problems made it for you to do your work, take care of things at home, or get along with other people?           GOLD-7           ROS:  Review of Systems   Constitutional:  Positive for fatigue. Negative for appetite change, diaphoresis and unexpected weight change.   HENT:  Negative for drooling, tinnitus and trouble swallowing.    Eyes:  Negative for visual disturbance.   Respiratory:  Negative for cough, chest tightness and shortness of breath.    Cardiovascular:  Positive for palpitations. Negative for chest pain.   Gastrointestinal:  Negative for abdominal pain,  constipation, diarrhea, nausea and vomiting.   Endocrine: Negative for cold intolerance and heat intolerance.   Genitourinary:  Negative for difficulty urinating.   Musculoskeletal:  Negative for arthralgias and myalgias.   Skin:  Negative for rash.   Allergic/Immunologic: Negative for immunocompromised state.   Neurological:  Negative for dizziness, tremors, seizures and headaches.   Psychiatric/Behavioral:  Positive for agitation and sleep disturbance. Negative for dysphoric mood, hallucinations, self-injury and suicidal ideas. The patient is nervous/anxious.        Problem List:  Patient Active Problem List   Diagnosis    Generalized anxiety disorder       Current Medications:   Current Outpatient Medications   Medication Sig Dispense Refill    drospirenone-ethinyl estradiol (YOGESH) 3-0.02 MG per tablet Take 1 tablet by mouth Daily. 28 tablet 12    melatonin 1 MG tablet Take  by mouth.      sulfamethoxazole-trimethoprim (BACTRIM DS,SEPTRA DS) 800-160 MG per tablet Take 1 tablet by mouth.       No current facility-administered medications for this visit.       Discontinued Medications:  There are no discontinued medications.              Allergy:   Allergies   Allergen Reactions    Amoxicillin Unknown - Low Severity    Vilazodone Hives    Vilazodone Hcl Hives    Red Dye Rash        Past Medical History:  Past Medical History:   Diagnosis Date    Anxiety     Depression     PCOS (polycystic ovarian syndrome)        Past Surgical History:  No past surgical history on file.    Past Psychiatric History:  Began Treatment: 16 yr/o started therapy   Diagnoses: Depression, anxiety   Psychiatrist: Denies  Therapist: Pt has been in and out of therapy.   Admission History: Denies  Medications/Treatment: Lexapro (did well at first), Zoloft (decreased appetite), viibryd (hives), prozac, wellbutrin  Self Harm: Denies  Suicide Attempts: Denies  Postpartum depression: N/A    Family Psychiatric History:   Diagnoses: Her grandmother  "has a h/o autism and her father has a h/o depression.   Substance use: Denies  Suicide Attempts/Completions: Denies    Family History   Problem Relation Age of Onset    Diabetes Father     Depression Father        Substance Abuse History:   Alcohol use: Rare  Nicotine: Denies  Illicit Drug Use: Denies  Longest Period Sober: Denies  Rehab/AA/NA: Denies    Social History:  Living Situation: Pt lives with her mother.  Marital/Relationship History: Single  Children: Denies  Work History/Occupation: Pt works at the [employer].   Education: Pt completed high school, some college.    History: Denies  Legal: Denies    Social History     Socioeconomic History    Marital status: Single   Tobacco Use    Smoking status: Never     Passive exposure: Never    Smokeless tobacco: Never   Vaping Use    Vaping status: Never Used   Substance and Sexual Activity    Alcohol use: Never    Drug use: Never    Sexual activity: Not Currently     Partners: Male       Developmental History:   Place of birth: Pt was born in [place].   Siblings: Denies  Childhood: Pt reports mother was a single mom and was very broke growing up.       Physical Exam:  Physical Exam  Appearance: Well-groomed with adequate hygiene, appears to be of stated age. Casually and neatly dressed, maintains good eye contact.   Behavior: Appropriate, cooperative. No acute distress.  Motor: No abnormal movements, tics or tremors are noted. No psychomotor agitation or retardation.  Speech: Coherent, spontaneous, appropriate with normal rate, volume, rhythm, and tone. Normal reaction time to questions. No hyperverbal or pressured speech.   Mood: \"I'm okay\"  Affect: Patient appears anxious at times  Thought content: Negative suicidal ideations, negative homicidal ideations. Patient denies any obsession, compulsion, or phobia. No evidence of delusions.  Perceptions: Negative auditory hallucinations, negative visual hallucinations. Pt does not appear to be actively " "responding to internal stimuli.   Thought process: Logical, goal-directed, coherent, and linear with no evidence of flight of ideas, looseness of associations, thought blocking, circumstantiality, or tangentiality.   Insight/Judgement: Fair/fair  Cognition: Alert and oriented to person, place, and date. Memory intact for recent and remote events. Attention and concentration intact.         Vital Signs:   /58   Pulse 115   Ht 170.2 cm (67.01\")   Wt 61.2 kg (135 lb)   BMI 21.14 kg/m²      Lab Results:   Hospital Outpatient Visit on 05/29/2024   Component Date Value Ref Range Status    QT Interval 05/29/2024 372  ms Final    QTC Interval 05/29/2024 393  ms Final   Office Visit on 04/26/2024   Component Date Value Ref Range Status    Glucose 04/26/2024 77  65 - 99 mg/dL Final    BUN 04/26/2024 12  6 - 20 mg/dL Final    Creatinine 04/26/2024 0.76  0.57 - 1.00 mg/dL Final    Sodium 04/26/2024 139  136 - 145 mmol/L Final    Potassium 04/26/2024 4.0  3.5 - 5.2 mmol/L Final    Chloride 04/26/2024 105  98 - 107 mmol/L Final    CO2 04/26/2024 24.4  22.0 - 29.0 mmol/L Final    Calcium 04/26/2024 9.5  8.6 - 10.5 mg/dL Final    Total Protein 04/26/2024 6.9  6.0 - 8.5 g/dL Final    Albumin 04/26/2024 4.6  3.5 - 5.2 g/dL Final    ALT (SGPT) 04/26/2024 9  1 - 33 U/L Final    AST (SGOT) 04/26/2024 14  1 - 32 U/L Final    Alkaline Phosphatase 04/26/2024 58  39 - 117 U/L Final    Total Bilirubin 04/26/2024 0.2  0.0 - 1.2 mg/dL Final    Globulin 04/26/2024 2.3  gm/dL Final    A/G Ratio 04/26/2024 2.0  g/dL Final    BUN/Creatinine Ratio 04/26/2024 15.8  7.0 - 25.0 Final    Anion Gap 04/26/2024 9.6  5.0 - 15.0 mmol/L Final    eGFR 04/26/2024 113.8  >60.0 mL/min/1.73 Final    Total Cholesterol 04/26/2024 153  0 - 200 mg/dL Final    Triglycerides 04/26/2024 68  0 - 150 mg/dL Final    HDL Cholesterol 04/26/2024 43  40 - 60 mg/dL Final    LDL Cholesterol  04/26/2024 97  0 - 100 mg/dL Final    VLDL Cholesterol 04/26/2024 13  5 - 40 " mg/dL Final    LDL/HDL Ratio 04/26/2024 2.24   Final    TSH 04/26/2024 1.450  0.270 - 4.200 uIU/mL Final    HCG, Urine, QL 04/26/2024 Negative  Negative Final    Lot Number 04/26/2024 [id number]   Final    Internal Positive Control 04/26/2024 Passed  Positive, Passed Final    Internal Negative Control 04/26/2024 Passed  Negative, Passed Final    Expiration Date 04/26/2024 04/08/2025   Final    WBC 04/26/2024 9.02  3.40 - 10.80 10*3/mm3 Final    RBC 04/26/2024 3.95  3.77 - 5.28 10*6/mm3 Final    Hemoglobin 04/26/2024 12.7  12.0 - 15.9 g/dL Final    Hematocrit 04/26/2024 38.0  34.0 - 46.6 % Final    MCV 04/26/2024 96.2  79.0 - 97.0 fL Final    MCH 04/26/2024 32.2  26.6 - 33.0 pg Final    MCHC 04/26/2024 33.4  31.5 - 35.7 g/dL Final    RDW 04/26/2024 11.8 (L)  12.3 - 15.4 % Final    RDW-SD 04/26/2024 41.6  37.0 - 54.0 fl Final    MPV 04/26/2024 11.0  6.0 - 12.0 fL Final    Platelets 04/26/2024 290  140 - 450 10*3/mm3 Final    Neutrophil % 04/26/2024 70.1  42.7 - 76.0 % Final    Lymphocyte % 04/26/2024 22.3  19.6 - 45.3 % Final    Monocyte % 04/26/2024 7.0  5.0 - 12.0 % Final    Eosinophil % 04/26/2024 0.1 (L)  0.3 - 6.2 % Final    Basophil % 04/26/2024 0.1  0.0 - 1.5 % Final    Immature Grans % 04/26/2024 0.4  0.0 - 0.5 % Final    Neutrophils, Absolute 04/26/2024 6.32  1.70 - 7.00 10*3/mm3 Final    Lymphocytes, Absolute 04/26/2024 2.01  0.70 - 3.10 10*3/mm3 Final    Monocytes, Absolute 04/26/2024 0.63  0.10 - 0.90 10*3/mm3 Final    Eosinophils, Absolute 04/26/2024 0.01  0.00 - 0.40 10*3/mm3 Final    Basophils, Absolute 04/26/2024 0.01  0.00 - 0.20 10*3/mm3 Final    Immature Grans, Absolute 04/26/2024 0.04  0.00 - 0.05 10*3/mm3 Final    nRBC 04/26/2024 0.0  0.0 - 0.2 /100 WBC Final   Admission on 10/02/2023, Discharged on 10/02/2023   Component Date Value Ref Range Status    Rapid Strep A Screen 10/02/2023 Negative   Final    Internal Control 10/02/2023 Passed   Final    Lot Number 10/02/2023 [id number]   Final    Expiration  Date 10/02/2023 12,[id number]   Final       EKG Results:  No orders to display       Imaging Results:  No Images in the past 120 days found..      Assessment & Plan   Diagnoses and all orders for this visit:    1. Generalized anxiety disorder (Primary)    2. Moderate episode of recurrent major depressive disorder    3. PMDD (premenstrual dysphoric disorder)    4. Insomnia due to other mental disorder          Patient screened positive for depression based on a PHQ-9 score of 6 on 4/22/2024. Follow-up recommendations include: Prescribed antidepressant medication treatment, Suicide Risk Assessment performed, and see assessment below .    Presentation seems most consistent with MDD, GOLD, PMDD, insomnia.  Encouraged patient to start Cymbalta although patient is reluctant as she would like to first do therapy.  [facility] and is on the wait list.  Patient states she will start taking Cymbalta if she feels like she needs to.  We will continue Cymbalta for management depression, anxiety, and overall mood.  Patient declines medication for sleep and would like to continue with melatonin.  Instructed patient to contact the office for any new or worsening symptoms or any other concerns.  Follow-up in 1 month.  Addressed all questions and concerns.    Visit Diagnoses:    ICD-10-CM ICD-9-CM   1. Generalized anxiety disorder  F41.1 300.02   2. Moderate episode of recurrent major depressive disorder  F33.1 296.32   3. PMDD (premenstrual dysphoric disorder)  F32.81 625.4   4. Insomnia due to other mental disorder  F51.05 300.9    F99 327.02           PLAN:  Safety: No acute safety concerns at this time.  Therapy: Will refer for psychotherapy to [facility]  Risk Assessment: Risk of self-harm acutely is moderate to severe.  Risk factors include anxiety disorder, mood disorder, intermittent SI (passive, no plan or intent, no present SI), and recent psychosocial stressors (pandemic). Protective factors include  no family history, denies access to guns/weapons, no history of suicide attempts or self-harm in the past, minimal AODA, healthcare seeking, future orientation, willingness to engage in care.  Risk of self-harm chronically is also moderate to severe, but could be further elevated in the event of treatment noncompliance and/or AODA.  Labs/Diagnostics Ordered:   No orders of the defined types were placed in this encounter.    Medications:   No orders of the defined types were placed in this encounter.    Discussed all risks, benefits, alternatives, and side effects of Duloxetine including but not limited to GI upset, sexual dysfunction, bleeding risk, seizure risk, weight loss, insomnia, diaphoresis, drowsiness, headache, dizziness, fatigue, activation of more or hypomania, increased fragility fracture risk, hyponatremia, increased BP, hepatotoxicity, ocular effects, withdrawal syndrome following abrupt discontinuation, serotonin syndrome, and activation of suicidal ideation and behavior.  Pt educated on the need to practice safe sex while taking this med. Discussed the need for pt to immediately call the office for any new or worsening symptoms, such as worsening depression; feeling nervous or restless; suicidal thoughts or actions; or other changes changes in mood or behavior, and all other concerns. Pt educated on med compliance and the risks of suddenly stopping this medication or missing doses. Pt verbalized understanding and is agreeable to taking Duloxetine. Addressed all questions and concerns.     Follow up:   F/u in 1 month.    TREATMENT PLAN/GOALS: Continue supportive psychotherapy efforts and medications as indicated. Treatment and medication options discussed during today's visit. Patient ackowledged and verbally consented to continue with current treatment plan and was educated on the importance of compliance with treatment and follow-up appointments.    MEDICATION ISSUES:  VANESSA reviewed as  expected.  Discussed medication options and treatment plan of prescribed medication as well as the risks, benefits, and side effects including potential falls, possible impaired driving and metabolic adversities among others. Patient is agreeable to call the office with any worsening of symptoms or onset of side effects. Patient is agreeable to call 911 or go to the nearest ER should he/she begin having SI/HI. No medication side effects or related complaints today.       16 minutes of supportive psychotherapy with goal to strengthen defenses, promote problems solving, restore adaptive functioning and provide symptom relief. The therapeutic alliance was strengthened to encourage the patient to express their thoughts and feelings. Esteem building was enhanced through praise, reassurance, normalizing and encouragement. Coping skills were enhanced to build distress tolerance skills and emotional regulation. Allowed patient to freely discuss issues without interruption or judgement with unconditional positive regard, active listening skills, and empathy. Provided a safe, confidential environment to facilitate the development of a positive therapeutic relationship and encourage open, honest communication. Assisted patient in identifying risk factors which would indicate the need for higher level of care including thoughts to harm self or others and/or self-harming behavior and encouraged patient to contact this office, call 911, or present to the nearest emergency room should any of these events occur. Assisted patient in processing session content; acknowledged and normalized patient's thoughts, feelings, and concerns by utilizing a person-centered approach in efforts to build appropriate rapport and a positive therapeutic relationship with open and honest communication. Patient given education on medication side effects, diagnosis/illness and relapse symptoms. Plan to continue supportive psychotherapy in next  appointment to provide symptom relief. 4 weeks     Diagnoses: as above  Symptoms: as above  Functional status: fair/good  Mental Status Exam: as above    Treatment plan: medication management and supportive psychotherapy  Prognosis: good  Progress: not at goal, decline since last visit         This document has been electronically signed by Janet Wallace PA-C  July 18, 2024 14:39 EDT      Part of this note may be an electronic transcription/translation of spoken language to printed text using the Dragon Dictation System.

## 2024-07-26 ENCOUNTER — TELEPHONE (OUTPATIENT)
Dept: FAMILY MEDICINE CLINIC | Facility: CLINIC | Age: 24
End: 2024-07-26
Payer: COMMERCIAL

## 2024-07-26 ENCOUNTER — PATIENT MESSAGE (OUTPATIENT)
Dept: FAMILY MEDICINE CLINIC | Facility: CLINIC | Age: 24
End: 2024-07-26
Payer: COMMERCIAL

## 2024-07-26 NOTE — TELEPHONE ENCOUNTER
HUB TO RELAY    Pt on schedule today at 12:45 for Physical. Patient is not due for a physical, just had in April. She is due for a Pap Smear. I left voicemail for patient to call back to let us know if this is for a Pap or if she needs something else done.

## 2024-08-27 ENCOUNTER — OFFICE VISIT (OUTPATIENT)
Dept: CARDIOLOGY | Facility: CLINIC | Age: 24
End: 2024-08-27
Payer: COMMERCIAL

## 2024-08-27 VITALS
SYSTOLIC BLOOD PRESSURE: 109 MMHG | HEART RATE: 102 BPM | BODY MASS INDEX: 21.6 KG/M2 | HEIGHT: 67 IN | DIASTOLIC BLOOD PRESSURE: 80 MMHG | WEIGHT: 137.6 LBS

## 2024-08-27 DIAGNOSIS — R94.31 ABNORMAL EKG: Primary | ICD-10-CM

## 2024-08-27 PROCEDURE — 99213 OFFICE O/P EST LOW 20 MIN: CPT

## 2024-08-27 RX ORDER — BUPROPION HYDROCHLORIDE 150 MG/1
150 TABLET ORAL EVERY MORNING
COMMUNITY
Start: 2024-07-21 | End: 2024-08-27

## 2024-08-27 NOTE — PROGRESS NOTES
"Chief Complaint  Abnormal ECG and Follow-up (F/U post testing results, holter complete. )    Subjective        History of Present Illness  Juliet Pittman presents to [facility]ARDIOLOG[facility] for follow up.   Patient is a 24-year-old female with no past medical history who presents for follow-up on recent testing.  She reports generalized fatigue and feeling sluggish.  She exercises on a regular basis with no exertional complaints.  She denies chest pain or discomfort or dyspnea.  She notes no palpitations.    Past Medical History:   Diagnosis Date    Anxiety     Depression     PCOS (polycystic ovarian syndrome)        ALLERGY  Allergies   Allergen Reactions    Amoxicillin Unknown - Low Severity    Vilazodone Hives    Vilazodone Hcl Hives    Red Dye #40 (Allura Red) Rash        History reviewed. No pertinent surgical history.     Social History     Socioeconomic History    Marital status: Single   Tobacco Use    Smoking status: Never     Passive exposure: Never    Smokeless tobacco: Never   Vaping Use    Vaping status: Never Used   Substance and Sexual Activity    Alcohol use: Never    Drug use: Never    Sexual activity: Not Currently     Partners: Male       Family History   Problem Relation Age of Onset    Diabetes Father     Depression Father         Current Outpatient Medications on File Prior to Visit   Medication Sig    drospirenone-ethinyl estradiol (YOGESH) 3-0.02 MG per tablet Take 1 tablet by mouth Daily.    melatonin 1 MG tablet Take  by mouth.    sulfamethoxazole-trimethoprim (BACTRIM DS,SEPTRA DS) 800-160 MG per tablet Take 1 tablet by mouth.    buPROPion XL (WELLBUTRIN XL) 150 MG 24 hr tablet Take 1 tablet by mouth Every Morning. (Patient not taking: Reported on 8/27/2024)     No current facility-administered medications on file prior to visit.       Objective   Vitals:    08/27/24 1428   BP: 109/80   Pulse: 102   Weight: 62.4 kg (137 lb 9.6 oz)   Height: 170.2 cm (67.01\")       Physical " Exam  Constitutional:       General: She is awake. She is not in acute distress.     Appearance: Normal appearance.   HENT:      Head: Normocephalic.      Nose: Nose normal. No congestion.   Eyes:      Extraocular Movements: Extraocular movements intact.      Conjunctiva/sclera: Conjunctivae normal.      Pupils: Pupils are equal, round, and reactive to light.   Neck:      Thyroid: No thyromegaly.      Vascular: No JVD.   Cardiovascular:      Rate and Rhythm: Normal rate and regular rhythm.      Chest Wall: PMI is not displaced.      Pulses: Normal pulses.      Heart sounds: Normal heart sounds, S1 normal and S2 normal. No murmur heard.     No friction rub. No gallop. No S3 or S4 sounds.   Pulmonary:      Effort: Pulmonary effort is normal.      Breath sounds: Normal breath sounds. No wheezing, rhonchi or rales.   Abdominal:      General: Bowel sounds are normal.      Palpations: Abdomen is soft.      Tenderness: There is no abdominal tenderness.   Musculoskeletal:      Cervical back: No tenderness.      Right lower leg: No edema.      Left lower leg: No edema.   Lymphadenopathy:      Cervical: No cervical adenopathy.   Skin:     General: Skin is warm and dry.      Capillary Refill: Capillary refill takes less than 2 seconds.      Coloration: Skin is not cyanotic.      Findings: No petechiae or rash.      Nails: There is no clubbing.   Neurological:      Mental Status: She is alert.   Psychiatric:         Mood and Affect: Mood normal.         Behavior: Behavior is cooperative.           Result Review     The following data was reviewed by ISABELLE Lopez on 08/27/24.      CMP          4/26/2024    15:32   CMP   Glucose 77    BUN 12    Creatinine 0.76    EGFR 113.8    Sodium 139    Potassium 4.0    Chloride 105    Calcium 9.5    Total Protein 6.9    Albumin 4.6    Globulin 2.3    Total Bilirubin 0.2    Alkaline Phosphatase 58    AST (SGOT) 14    ALT (SGPT) 9    Albumin/Globulin Ratio 2.0    BUN/Creatinine  Ratio 15.8    Anion Gap 9.6      CBC w/diff          4/26/2024    15:32   CBC w/Diff   WBC 9.02    RBC 3.95    Hemoglobin 12.7    Hematocrit 38.0    MCV 96.2    MCH 32.2    MCHC 33.4    RDW 11.8    Platelets 290    Neutrophil Rel % 70.1    Immature Granulocyte Rel % 0.4    Lymphocyte Rel % 22.3    Monocyte Rel % 7.0    Eosinophil Rel % 0.1    Basophil Rel % 0.1       Lipid Panel          4/26/2024    15:32   Lipid Panel   Total Cholesterol 153    Triglycerides 68    HDL Cholesterol 43    VLDL Cholesterol 13    LDL Cholesterol  97    LDL/HDL Ratio 2.24            === Results for orders placed in visit on 07/16/24 ===    CARDIAC EVENT MONITOR    - Interpretation Summary -    Patient was monitored for 11 days, 1 hour and 22 minutes.    Lowest heart rate was 48 bpm, average heart rate was 84 bpm, maximum heart rate was 161 bpm.    No PVCs.  Very rare PAC.    1 patient activated event which corresponds to normal heart rhythm (sinus tachycardia).    No abnormal heart rhythms noted.          Procedures    Assessment & Plan  Diagnoses and all orders for this visit:    1. Abnormal EKG (Primary)        1.  Patient had this abnormal EKG that demonstrated sinus arrhythmia which is a normal variant for her age.  Event monitor was done to rule out arrhythmias.  There were no abnormal heart rhythms noted on her monitor.  She was reassured.              Follow Up   Return if symptoms worsen or fail to improve.    Patient was given instructions and counseling regarding her condition or for health maintenance advice. Please see specific information pulled into the AVS if appropriate.     Violeta Mcconnell, ISABELLE  08/27/24  14:48 EDT    Dictated Utilizing Dragon Dictation

## 2024-08-30 ENCOUNTER — OFFICE VISIT (OUTPATIENT)
Dept: BEHAVIORAL HEALTH | Facility: CLINIC | Age: 24
End: 2024-08-30
Payer: COMMERCIAL

## 2024-08-30 VITALS
WEIGHT: 138 LBS | HEART RATE: 99 BPM | BODY MASS INDEX: 21.66 KG/M2 | HEIGHT: 67 IN | SYSTOLIC BLOOD PRESSURE: 108 MMHG | DIASTOLIC BLOOD PRESSURE: 74 MMHG

## 2024-08-30 DIAGNOSIS — F41.1 GENERALIZED ANXIETY DISORDER: Primary | ICD-10-CM

## 2024-08-30 DIAGNOSIS — F33.1 MODERATE EPISODE OF RECURRENT MAJOR DEPRESSIVE DISORDER: ICD-10-CM

## 2024-08-30 DIAGNOSIS — F32.81 PMDD (PREMENSTRUAL DYSPHORIC DISORDER): ICD-10-CM

## 2024-08-30 DIAGNOSIS — F51.05 INSOMNIA DUE TO OTHER MENTAL DISORDER: ICD-10-CM

## 2024-08-30 DIAGNOSIS — F99 INSOMNIA DUE TO OTHER MENTAL DISORDER: ICD-10-CM

## 2024-08-30 RX ORDER — TRETINOIN 0.5 MG/G
CREAM TOPICAL
COMMUNITY
Start: 2024-08-28

## 2024-08-30 NOTE — PROGRESS NOTES
Chief Complaint:  Depression, anxiety     History of Present Illness: Juliet Pittman is a 24 y.o. female who presents today for f/u of mood.  Patient has not started Cymbalta as she does not feel like she needs at this time.  Pt denies feeling depressed. No crying episodes. Pt has had anhedonia. Pt denies having any SI or HI. Pt has some difficulty sleeping that varies, although is better with melatonin. Pt admits to poor sleep hygiene. She has had some improvement with melatonin as needed.  Anxiety, worse with social interactions. Pt saw Antonio for therapy on 8/20/24.       Medical Record Review: Reviewed office visit note from 1/23/23, Patient was initially started on Vilazodone for depression and anxiety. She had previously tried several other medications but felt that they were not effective. This medication caused her to breakout in hives. She has since stopped taking it and began taking Prozac that was previously prescribed to her by different provider. She would like to continue taking this medication. She has recently started a new job and is trying to go to the gym as well to help with her mental health. She has an upcoming appointment with psychiatry on 2/10/2023.         Reviewed cardiology note from 7/16/24, reviewed assessment - 1.  Patient's EKG shows sinus arrhythmia.  This is a completely normal finding in a young patient.  There are no significant abnormalities.  2.  I did explain to patient that her max heart rate would be close to 200 bpm with exercising.  3.  We also discussed the more cardio people do the longer it takes for the heart rate to come up.  4.  We will plan on placing a 2-week event recorder just to make sure she does not have any abnormal heart rhythms.  She does note some episodes where she is just laying down and her heart seems to race.  5.  Patient is off her anxiety medication as they noted it could cause an increase in heart rate.  If she needed something in the future  for anxiety propranolol might be a good medication and we discussed this.         PHQ-9 Depression Screening:  Little interest or pleasure in doing things?     Feeling down, depressed, or hopeless?     Trouble falling or staying asleep, or sleeping too much?     Feeling tired or having little energy?     Poor appetite or overeating?     Feeling bad about yourself - or that you are a failure or have let yourself or your family down?     Trouble concentrating on things, such as reading the newspaper or watching television?     Moving or speaking so slowly that other people could have noticed? Or the opposite - being so fidgety or restless that you have been moving around a lot more than usual?     Thoughts that you would be better off dead, or of hurting yourself in some way?     PHQ-9 Total Score     If you checked off any problems, how difficult have these problems made it for you to do your work, take care of things at home, or get along with other people?           GOLD-7           ROS:  Review of Systems   Constitutional:  Positive for fatigue. Negative for appetite change, diaphoresis and unexpected weight change.   HENT:  Negative for drooling, tinnitus and trouble swallowing.    Eyes:  Negative for visual disturbance.   Respiratory:  Negative for cough, chest tightness and shortness of breath.    Cardiovascular:  Positive for palpitations. Negative for chest pain.   Gastrointestinal:  Negative for abdominal pain, constipation, diarrhea, nausea and vomiting.   Endocrine: Negative for cold intolerance and heat intolerance.   Genitourinary:  Negative for difficulty urinating.   Musculoskeletal:  Negative for arthralgias and myalgias.   Skin:  Negative for rash.   Allergic/Immunologic: Negative for immunocompromised state.   Neurological:  Negative for dizziness, tremors, seizures and headaches.   Psychiatric/Behavioral:  Positive for agitation and sleep disturbance. Negative for dysphoric mood, hallucinations,  self-injury and suicidal ideas. The patient is nervous/anxious.        Problem List:  Patient Active Problem List   Diagnosis    Generalized anxiety disorder       Current Medications:   Current Outpatient Medications   Medication Sig Dispense Refill    drospirenone-ethinyl estradiol (YOGESH) 3-0.02 MG per tablet Take 1 tablet by mouth Daily. 28 tablet 12    melatonin 1 MG tablet Take  by mouth.      sulfamethoxazole-trimethoprim (BACTRIM DS,SEPTRA DS) 800-160 MG per tablet Take 1 tablet by mouth.      tretinoin (RETIN-A) 0.05 % cream  (Patient not taking: Reported on 8/30/2024)       No current facility-administered medications for this visit.       Discontinued Medications:  There are no discontinued medications.              Allergy:   Allergies   Allergen Reactions    Amoxicillin Unknown - Low Severity    Vilazodone Hives    Vilazodone Hcl Hives    Red Dye #40 (Allura Red) Rash        Past Medical History:  Past Medical History:   Diagnosis Date    Anxiety     Depression     PCOS (polycystic ovarian syndrome)        Past Surgical History:  No past surgical history on file.    Past Psychiatric History:  Began Treatment: 16 yr/o started therapy   Diagnoses: Depression, anxiety   Psychiatrist: Denies  Therapist: Pt has been in and out of therapy.   Admission History: Denies  Medications/Treatment: Lexapro (did well at first), Zoloft (decreased appetite), viibryd (hives), prozac, wellbutrin  Self Harm: Denies  Suicide Attempts: Denies  Postpartum depression: N/A    Family Psychiatric History:   Diagnoses: Her grandmother has a h/o autism and her father has a h/o depression.   Substance use: Denies  Suicide Attempts/Completions: Denies    Family History   Problem Relation Age of Onset    Diabetes Father     Depression Father        Substance Abuse History:   Alcohol use: Rare  Nicotine: Denies  Illicit Drug Use: Denies  Longest Period Sober: Denies  Rehab/AA/NA: Denies    Social History:  Living Situation: Pt lives with  "her mother.  Marital/Relationship History: Single  Children: Denies  Work History/Occupation: Pt works at the [employer].   Education: Pt completed high school, some college.    History: Denies  Legal: Denies    Social History     Socioeconomic History    Marital status: Single   Tobacco Use    Smoking status: Never     Passive exposure: Never    Smokeless tobacco: Never   Vaping Use    Vaping status: Never Used   Substance and Sexual Activity    Alcohol use: Never    Drug use: Never    Sexual activity: Not Currently     Partners: Male       Developmental History:   Place of birth: Pt was born in [place].   Siblings: Denies  Childhood: Pt reports mother was a single mom and was very broke growing up.       Physical Exam:  Physical Exam  Appearance: Well-groomed with adequate hygiene, appears to be of stated age. Casually and neatly dressed, maintains good eye contact.   Behavior: Appropriate, cooperative. No acute distress.  Motor: No abnormal movements, tics or tremors are noted. No psychomotor agitation or retardation.  Speech: Coherent, spontaneous, appropriate with normal rate, volume, rhythm, and tone. Normal reaction time to questions. No hyperverbal or pressured speech.   Mood: \"I'm okay\"  Affect: Pt appears slightly anxious at times  Thought content: Negative suicidal ideations, negative homicidal ideations. Patient denies any obsession, compulsion, or phobia. No evidence of delusions.  Perceptions: Negative auditory hallucinations, negative visual hallucinations. Pt does not appear to be actively responding to internal stimuli.   Thought process: Logical, goal-directed, coherent, and linear with no evidence of flight of ideas, looseness of associations, thought blocking, circumstantiality, or tangentiality.   Insight/Judgement: Fair/fair  Cognition: Alert and oriented to person, place, and date. Memory intact for recent and remote events. Attention and concentration intact.     I have reexamined the " "patient and the results are consistent with the previously documented exam. Janet Wallace PA-C         Vital Signs:   /74   Pulse 99   Ht 170.2 cm (67.01\")   Wt 62.6 kg (138 lb)   BMI 21.61 kg/m²      Lab Results:   Hospital Outpatient Visit on 05/29/2024   Component Date Value Ref Range Status    QT Interval 05/29/2024 372  ms Final    QTC Interval 05/29/2024 393  ms Final   Office Visit on 04/26/2024   Component Date Value Ref Range Status    Glucose 04/26/2024 77  65 - 99 mg/dL Final    BUN 04/26/2024 12  6 - 20 mg/dL Final    Creatinine 04/26/2024 0.76  0.57 - 1.00 mg/dL Final    Sodium 04/26/2024 139  136 - 145 mmol/L Final    Potassium 04/26/2024 4.0  3.5 - 5.2 mmol/L Final    Chloride 04/26/2024 105  98 - 107 mmol/L Final    CO2 04/26/2024 24.4  22.0 - 29.0 mmol/L Final    Calcium 04/26/2024 9.5  8.6 - 10.5 mg/dL Final    Total Protein 04/26/2024 6.9  6.0 - 8.5 g/dL Final    Albumin 04/26/2024 4.6  3.5 - 5.2 g/dL Final    ALT (SGPT) 04/26/2024 9  1 - 33 U/L Final    AST (SGOT) 04/26/2024 14  1 - 32 U/L Final    Alkaline Phosphatase 04/26/2024 58  39 - 117 U/L Final    Total Bilirubin 04/26/2024 0.2  0.0 - 1.2 mg/dL Final    Globulin 04/26/2024 2.3  gm/dL Final    A/G Ratio 04/26/2024 2.0  g/dL Final    BUN/Creatinine Ratio 04/26/2024 15.8  7.0 - 25.0 Final    Anion Gap 04/26/2024 9.6  5.0 - 15.0 mmol/L Final    eGFR 04/26/2024 113.8  >60.0 mL/min/1.73 Final    Total Cholesterol 04/26/2024 153  0 - 200 mg/dL Final    Triglycerides 04/26/2024 68  0 - 150 mg/dL Final    HDL Cholesterol 04/26/2024 43  40 - 60 mg/dL Final    LDL Cholesterol  04/26/2024 97  0 - 100 mg/dL Final    VLDL Cholesterol 04/26/2024 13  5 - 40 mg/dL Final    LDL/HDL Ratio 04/26/2024 2.24   Final    TSH 04/26/2024 1.450  0.270 - 4.200 uIU/mL Final    HCG, Urine, QL 04/26/2024 Negative  Negative Final    Lot Number 04/26/2024 [id number]   Final    Internal Positive Control 04/26/2024 Passed  Positive, Passed Final    Internal " Negative Control 04/26/2024 Passed  Negative, Passed Final    Expiration Date 04/26/2024 04/08/2025   Final    WBC 04/26/2024 9.02  3.40 - 10.80 10*3/mm3 Final    RBC 04/26/2024 3.95  3.77 - 5.28 10*6/mm3 Final    Hemoglobin 04/26/2024 12.7  12.0 - 15.9 g/dL Final    Hematocrit 04/26/2024 38.0  34.0 - 46.6 % Final    MCV 04/26/2024 96.2  79.0 - 97.0 fL Final    MCH 04/26/2024 32.2  26.6 - 33.0 pg Final    MCHC 04/26/2024 33.4  31.5 - 35.7 g/dL Final    RDW 04/26/2024 11.8 (L)  12.3 - 15.4 % Final    RDW-SD 04/26/2024 41.6  37.0 - 54.0 fl Final    MPV 04/26/2024 11.0  6.0 - 12.0 fL Final    Platelets 04/26/2024 290  140 - 450 10*3/mm3 Final    Neutrophil % 04/26/2024 70.1  42.7 - 76.0 % Final    Lymphocyte % 04/26/2024 22.3  19.6 - 45.3 % Final    Monocyte % 04/26/2024 7.0  5.0 - 12.0 % Final    Eosinophil % 04/26/2024 0.1 (L)  0.3 - 6.2 % Final    Basophil % 04/26/2024 0.1  0.0 - 1.5 % Final    Immature Grans % 04/26/2024 0.4  0.0 - 0.5 % Final    Neutrophils, Absolute 04/26/2024 6.32  1.70 - 7.00 10*3/mm3 Final    Lymphocytes, Absolute 04/26/2024 2.01  0.70 - 3.10 10*3/mm3 Final    Monocytes, Absolute 04/26/2024 0.63  0.10 - 0.90 10*3/mm3 Final    Eosinophils, Absolute 04/26/2024 0.01  0.00 - 0.40 10*3/mm3 Final    Basophils, Absolute 04/26/2024 0.01  0.00 - 0.20 10*3/mm3 Final    Immature Grans, Absolute 04/26/2024 0.04  0.00 - 0.05 10*3/mm3 Final    nRBC 04/26/2024 0.0  0.0 - 0.2 /100 WBC Final   Admission on 10/02/2023, Discharged on 10/02/2023   Component Date Value Ref Range Status    Rapid Strep A Screen 10/02/2023 Negative   Final    Internal Control 10/02/2023 Passed   Final    Lot Number 10/02/2023 [id number]   Final    Expiration Date 10/02/2023 12,[id number]   Final       EKG Results:  No orders to display       Imaging Results:  No Images in the past 120 days found..      Assessment & Plan   Diagnoses and all orders for this visit:    1. Generalized anxiety disorder (Primary)    2. Moderate episode of  recurrent major depressive disorder    3. PMDD (premenstrual dysphoric disorder)    4. Insomnia due to other mental disorder            Patient screened positive for depression based on a PHQ-9 score of 6 on 4/22/2024. Follow-up recommendations include: Prescribed antidepressant medication treatment, Suicide Risk Assessment performed, and see assessment below .    Presentation seems most consistent with MDD, GOLD, PMDD, insomnia.  Encouraged patient to start Cymbalta although patient is reluctant as she would like to first do therapy.  Patient states she will start taking Cymbalta if she feels like she needs to.  We will continue Cymbalta for management depression, anxiety, and overall mood.  Patient declines medication for sleep and would like to continue with melatonin.  Instructed patient to contact the office for any new or worsening symptoms or any other concerns.  Follow-up in 2 months.  Addressed all questions and concerns.    I have reviewed the assessment and plan and verified the accuracy of it. No changes to assessment and plan since the information was documented. Janet Wallace PA-C 08/30/24       Visit Diagnoses:    ICD-10-CM ICD-9-CM   1. Generalized anxiety disorder  F41.1 300.02   2. Moderate episode of recurrent major depressive disorder  F33.1 296.32   3. PMDD (premenstrual dysphoric disorder)  F32.81 625.4   4. Insomnia due to other mental disorder  F51.05 300.9    F99 327.02             PLAN:  Safety: No acute safety concerns at this time.  Therapy: Will refer for psychotherapy to [facility]  Risk Assessment: Risk of self-harm acutely is moderate to severe.  Risk factors include anxiety disorder, mood disorder, intermittent SI (passive, no plan or intent, no present SI), and recent psychosocial stressors (pandemic). Protective factors include no family history, denies access to guns/weapons, no history of suicide attempts or self-harm in the past, minimal AODA, healthcare seeking, future orientation,  willingness to engage in care.  Risk of self-harm chronically is also moderate to severe, but could be further elevated in the event of treatment noncompliance and/or AODA.  Labs/Diagnostics Ordered:   No orders of the defined types were placed in this encounter.    Medications:   No orders of the defined types were placed in this encounter.    Discussed all risks, benefits, alternatives, and side effects of Duloxetine including but not limited to GI upset, sexual dysfunction, bleeding risk, seizure risk, weight loss, insomnia, diaphoresis, drowsiness, headache, dizziness, fatigue, activation of more or hypomania, increased fragility fracture risk, hyponatremia, increased BP, hepatotoxicity, ocular effects, withdrawal syndrome following abrupt discontinuation, serotonin syndrome, and activation of suicidal ideation and behavior.  Pt educated on the need to practice safe sex while taking this med. Discussed the need for pt to immediately call the office for any new or worsening symptoms, such as worsening depression; feeling nervous or restless; suicidal thoughts or actions; or other changes changes in mood or behavior, and all other concerns. Pt educated on med compliance and the risks of suddenly stopping this medication or missing doses. Pt verbalized understanding and is agreeable to taking Duloxetine. Addressed all questions and concerns.     Follow up:   F/u in 2 months    TREATMENT PLAN/GOALS: Continue supportive psychotherapy efforts and medications as indicated. Treatment and medication options discussed during today's visit. Patient ackowledged and verbally consented to continue with current treatment plan and was educated on the importance of compliance with treatment and follow-up appointments.    MEDICATION ISSUES:  VANESSA reviewed as expected.  Discussed medication options and treatment plan of prescribed medication as well as the risks, benefits, and side effects including potential falls, possible  impaired driving and metabolic adversities among others. Patient is agreeable to call the office with any worsening of symptoms or onset of side effects. Patient is agreeable to call 911 or go to the nearest ER should he/she begin having SI/HI. No medication side effects or related complaints today.       22 minutes of supportive psychotherapy with goal to strengthen defenses, promote problems solving, restore adaptive functioning and provide symptom relief. The therapeutic alliance was strengthened to encourage the patient to express their thoughts and feelings. Esteem building was enhanced through praise, reassurance, normalizing and encouragement. Coping skills were enhanced to build distress tolerance skills and emotional regulation. Allowed patient to freely discuss issues without interruption or judgement with unconditional positive regard, active listening skills, and empathy. Provided a safe, confidential environment to facilitate the development of a positive therapeutic relationship and encourage open, honest communication. Assisted patient in identifying risk factors which would indicate the need for higher level of care including thoughts to harm self or others and/or self-harming behavior and encouraged patient to contact this office, call 911, or present to the nearest emergency room should any of these events occur. Assisted patient in processing session content; acknowledged and normalized patient's thoughts, feelings, and concerns by utilizing a person-centered approach in efforts to build appropriate rapport and a positive therapeutic relationship with open and honest communication. Patient given education on medication side effects, diagnosis/illness and relapse symptoms. Plan to continue supportive psychotherapy in next appointment to provide symptom relief. 8 weeks     Diagnoses: as above  Symptoms: as above  Functional status: fair/good  Mental Status Exam: as above    Treatment plan:  medication management and supportive psychotherapy  Prognosis: good  Progress: continued improvement           This document has been electronically signed by Janet Wallace PA-C  August 30, 2024 16:29 EDT      Part of this note may be an electronic transcription/translation of spoken language to printed text using the Dragon Dictation System.

## 2024-10-21 DIAGNOSIS — F33.0 MILD EPISODE OF RECURRENT MAJOR DEPRESSIVE DISORDER: ICD-10-CM

## 2024-10-21 DIAGNOSIS — F41.1 GENERALIZED ANXIETY DISORDER: ICD-10-CM

## 2024-10-21 RX ORDER — BUPROPION HYDROCHLORIDE 150 MG/1
150 TABLET ORAL EVERY MORNING
Qty: 90 TABLET | Refills: 0 | OUTPATIENT
Start: 2024-10-21

## 2024-10-21 NOTE — TELEPHONE ENCOUNTER
The original prescription was discontinued on 6/7/2024 by Janet Wallace PA-C. Renewing this prescription may not be appropriate.     PLEASE ADVISE

## 2024-10-31 ENCOUNTER — OFFICE VISIT (OUTPATIENT)
Dept: BEHAVIORAL HEALTH | Facility: CLINIC | Age: 24
End: 2024-10-31
Payer: COMMERCIAL

## 2024-10-31 VITALS
HEART RATE: 90 BPM | HEIGHT: 67 IN | SYSTOLIC BLOOD PRESSURE: 112 MMHG | BODY MASS INDEX: 22.13 KG/M2 | DIASTOLIC BLOOD PRESSURE: 60 MMHG | WEIGHT: 141 LBS

## 2024-10-31 DIAGNOSIS — F40.10 SOCIAL ANXIETY DISORDER: Primary | ICD-10-CM

## 2024-10-31 DIAGNOSIS — F51.05 INSOMNIA DUE TO OTHER MENTAL DISORDER: ICD-10-CM

## 2024-10-31 DIAGNOSIS — F33.1 MODERATE EPISODE OF RECURRENT MAJOR DEPRESSIVE DISORDER: ICD-10-CM

## 2024-10-31 DIAGNOSIS — F32.81 PMDD (PREMENSTRUAL DYSPHORIC DISORDER): ICD-10-CM

## 2024-10-31 DIAGNOSIS — F99 INSOMNIA DUE TO OTHER MENTAL DISORDER: ICD-10-CM

## 2024-10-31 RX ORDER — MIRTAZAPINE 15 MG/1
15 TABLET, FILM COATED ORAL NIGHTLY
Qty: 30 TABLET | Refills: 1 | Status: SHIPPED | OUTPATIENT
Start: 2024-10-31

## 2024-10-31 NOTE — PROGRESS NOTES
Chief Complaint:  Depression, anxiety     History of Present Illness: Juliet Pittman is a 24 y.o. female who presents today for f/u of mood.  Patient has not started Cymbalta yet. Pt c/o sadness and feeling withdrawn that's been constant over the past few weeks. No crying episodes. Pt has had anhedonia and difficulty with motivation. Pt denies having any SI or HI. Pt c/o difficulty sleeping. She has had some improvement with melatonin as needed.  Pt c/o anxiety that comes and goes, only with social interactions. She also c/o irritability. Pt states she feels disconnected from her self. Pt saw Antonio for therapy.       Medical Record Review: Reviewed office visit note from 1/23/23, Patient was initially started on Vilazodone for depression and anxiety. She had previously tried several other medications but felt that they were not effective. This medication caused her to breakout in hives. She has since stopped taking it and began taking Prozac that was previously prescribed to her by different provider. She would like to continue taking this medication. She has recently started a new job and is trying to go to the gym as well to help with her mental health. She has an upcoming appointment with psychiatry on 2/10/2023.         Reviewed cardiology note from 7/16/24, reviewed assessment - 1.  Patient's EKG shows sinus arrhythmia.  This is a completely normal finding in a young patient.  There are no significant abnormalities.  2.  I did explain to patient that her max heart rate would be close to 200 bpm with exercising.  3.  We also discussed the more cardio people do the longer it takes for the heart rate to come up.  4.  We will plan on placing a 2-week event recorder just to make sure she does not have any abnormal heart rhythms.  She does note some episodes where she is just laying down and her heart seems to race.  5.  Patient is off her anxiety medication as they noted it could cause an increase in heart  rate.  If she needed something in the future for anxiety propranolol might be a good medication and we discussed this.         PHQ-9 Depression Screening:  Little interest or pleasure in doing things?     Feeling down, depressed, or hopeless?     Trouble falling or staying asleep, or sleeping too much?     Feeling tired or having little energy?     Poor appetite or overeating?     Feeling bad about yourself - or that you are a failure or have let yourself or your family down?     Trouble concentrating on things, such as reading the newspaper or watching television?     Moving or speaking so slowly that other people could have noticed? Or the opposite - being so fidgety or restless that you have been moving around a lot more than usual?     Thoughts that you would be better off dead, or of hurting yourself in some way?     PHQ-9 Total Score     If you checked off any problems, how difficult have these problems made it for you to do your work, take care of things at home, or get along with other people?           GOLD-7           ROS:  Review of Systems   Constitutional:  Positive for fatigue. Negative for appetite change, diaphoresis and unexpected weight change.   HENT:  Negative for drooling, tinnitus and trouble swallowing.    Eyes:  Negative for visual disturbance.   Respiratory:  Negative for cough, chest tightness and shortness of breath.    Cardiovascular:  Positive for palpitations. Negative for chest pain.   Gastrointestinal:  Negative for abdominal pain, constipation, diarrhea, nausea and vomiting.   Endocrine: Negative for cold intolerance and heat intolerance.   Genitourinary:  Negative for difficulty urinating.   Musculoskeletal:  Negative for arthralgias and myalgias.   Skin:  Negative for rash.   Allergic/Immunologic: Negative for immunocompromised state.   Neurological:  Negative for dizziness, tremors, seizures and headaches.   Psychiatric/Behavioral:  Positive for agitation, dysphoric mood and sleep  disturbance. Negative for hallucinations, self-injury and suicidal ideas. The patient is nervous/anxious.        Problem List:  Patient Active Problem List   Diagnosis    Generalized anxiety disorder       Current Medications:   Current Outpatient Medications   Medication Sig Dispense Refill    drospirenone-ethinyl estradiol (YOGESH) 3-0.02 MG per tablet Take 1 tablet by mouth Daily. 28 tablet 12    melatonin 1 MG tablet Take  by mouth.      mirtazapine (Remeron) 15 MG tablet Take 1 tablet by mouth Every Night. 30 tablet 1    sulfamethoxazole-trimethoprim (BACTRIM DS,SEPTRA DS) 800-160 MG per tablet Take 1 tablet by mouth.      tretinoin (RETIN-A) 0.05 % cream  (Patient not taking: Reported on 8/30/2024)       No current facility-administered medications for this visit.       Discontinued Medications:  There are no discontinued medications.              Allergy:   Allergies   Allergen Reactions    Amoxicillin Unknown - Low Severity    Vilazodone Hives    Vilazodone Hcl Hives    Red Dye #40 (Allura Red) Rash        Past Medical History:  Past Medical History:   Diagnosis Date    Anxiety     Depression     PCOS (polycystic ovarian syndrome)        Past Surgical History:  No past surgical history on file.    Past Psychiatric History:  Began Treatment: 16 yr/o started therapy   Diagnoses: Depression, anxiety   Psychiatrist: Denies  Therapist: Pt has been in and out of therapy.   Admission History: Denies  Medications/Treatment: Lexapro (did well at first), Zoloft (decreased appetite), viibryd (hives), prozac, wellbutrin  Self Harm: Denies  Suicide Attempts: Denies  Postpartum depression: N/A    Family Psychiatric History:   Diagnoses: Her grandmother has a h/o autism and her father has a h/o depression.   Substance use: Denies  Suicide Attempts/Completions: Denies    Family History   Problem Relation Age of Onset    Diabetes Father     Depression Father        Substance Abuse History:   Alcohol use: Rare  Nicotine:  "Denies  Illicit Drug Use: Denies  Longest Period Sober: Denies  Rehab/AA/NA: Denies    Social History:  Living Situation: Pt lives with her mother.  Marital/Relationship History: Single  Children: Denies  Work History/Occupation: Pt works at the [employer].   Education: Pt completed high school, some college.    History: Denies  Legal: Denies    Social History     Socioeconomic History    Marital status: Single   Tobacco Use    Smoking status: Never     Passive exposure: Never    Smokeless tobacco: Never   Vaping Use    Vaping status: Never Used   Substance and Sexual Activity    Alcohol use: Never    Drug use: Never    Sexual activity: Not Currently     Partners: Male       Developmental History:   Place of birth: Pt was born in [place].   Siblings: Denies  Childhood: Pt reports mother was a single mom and was very broke growing up.       Physical Exam:  Physical Exam  Appearance: Well-groomed with adequate hygiene, appears to be of stated age. Casually and neatly dressed, maintains good eye contact.   Behavior: Appropriate, cooperative. No acute distress.  Motor: No abnormal movements, tics or tremors are noted. No psychomotor agitation or retardation.  Speech: Coherent, spontaneous, appropriate with normal rate, volume, rhythm, and tone. Normal reaction time to questions. No hyperverbal or pressured speech.   Mood: \"I'm okay\"  Affect: Pt appears slightly depressed and slightly anxious at times  Thought content: Negative suicidal ideations, negative homicidal ideations. Patient denies any obsession, compulsion, or phobia. No evidence of delusions.  Perceptions: Negative auditory hallucinations, negative visual hallucinations. Pt does not appear to be actively responding to internal stimuli.   Thought process: Logical, goal-directed, coherent, and linear with no evidence of flight of ideas, looseness of associations, thought blocking, circumstantiality, or tangentiality.   Insight/Judgement: " "Fair/fair  Cognition: Alert and oriented to person, place, and date. Memory intact for recent and remote events. Attention and concentration intact.     I have reexamined the patient and the results are consistent with the previously documented exam. Janet Wallace PA-C         Vital Signs:   /60   Pulse 90   Ht 170.2 cm (67.01\")   Wt 64 kg (141 lb)   BMI 22.08 kg/m²      Lab Results:   Hospital Outpatient Visit on 05/29/2024   Component Date Value Ref Range Status    QT Interval 05/29/2024 372  ms Final    QTC Interval 05/29/2024 393  ms Final   Office Visit on 04/26/2024   Component Date Value Ref Range Status    Glucose 04/26/2024 77  65 - 99 mg/dL Final    BUN 04/26/2024 12  6 - 20 mg/dL Final    Creatinine 04/26/2024 0.76  0.57 - 1.00 mg/dL Final    Sodium 04/26/2024 139  136 - 145 mmol/L Final    Potassium 04/26/2024 4.0  3.5 - 5.2 mmol/L Final    Chloride 04/26/2024 105  98 - 107 mmol/L Final    CO2 04/26/2024 24.4  22.0 - 29.0 mmol/L Final    Calcium 04/26/2024 9.5  8.6 - 10.5 mg/dL Final    Total Protein 04/26/2024 6.9  6.0 - 8.5 g/dL Final    Albumin 04/26/2024 4.6  3.5 - 5.2 g/dL Final    ALT (SGPT) 04/26/2024 9  1 - 33 U/L Final    AST (SGOT) 04/26/2024 14  1 - 32 U/L Final    Alkaline Phosphatase 04/26/2024 58  39 - 117 U/L Final    Total Bilirubin 04/26/2024 0.2  0.0 - 1.2 mg/dL Final    Globulin 04/26/2024 2.3  gm/dL Final    A/G Ratio 04/26/2024 2.0  g/dL Final    BUN/Creatinine Ratio 04/26/2024 15.8  7.0 - 25.0 Final    Anion Gap 04/26/2024 9.6  5.0 - 15.0 mmol/L Final    eGFR 04/26/2024 113.8  >60.0 mL/min/1.73 Final    Total Cholesterol 04/26/2024 153  0 - 200 mg/dL Final    Triglycerides 04/26/2024 68  0 - 150 mg/dL Final    HDL Cholesterol 04/26/2024 43  40 - 60 mg/dL Final    LDL Cholesterol  04/26/2024 97  0 - 100 mg/dL Final    VLDL Cholesterol 04/26/2024 13  5 - 40 mg/dL Final    LDL/HDL Ratio 04/26/2024 2.24   Final    TSH 04/26/2024 1.450  0.270 - 4.200 uIU/mL Final    HCG, " Urine, QL 04/26/2024 Negative  Negative Final    Lot Number 04/26/2024 [id number]   Final    Internal Positive Control 04/26/2024 Passed  Positive, Passed Final    Internal Negative Control 04/26/2024 Passed  Negative, Passed Final    Expiration Date 04/26/2024 04/08/2025   Final    WBC 04/26/2024 9.02  3.40 - 10.80 10*3/mm3 Final    RBC 04/26/2024 3.95  3.77 - 5.28 10*6/mm3 Final    Hemoglobin 04/26/2024 12.7  12.0 - 15.9 g/dL Final    Hematocrit 04/26/2024 38.0  34.0 - 46.6 % Final    MCV 04/26/2024 96.2  79.0 - 97.0 fL Final    MCH 04/26/2024 32.2  26.6 - 33.0 pg Final    MCHC 04/26/2024 33.4  31.5 - 35.7 g/dL Final    RDW 04/26/2024 11.8 (L)  12.3 - 15.4 % Final    RDW-SD 04/26/2024 41.6  37.0 - 54.0 fl Final    MPV 04/26/2024 11.0  6.0 - 12.0 fL Final    Platelets 04/26/2024 290  140 - 450 10*3/mm3 Final    Neutrophil % 04/26/2024 70.1  42.7 - 76.0 % Final    Lymphocyte % 04/26/2024 22.3  19.6 - 45.3 % Final    Monocyte % 04/26/2024 7.0  5.0 - 12.0 % Final    Eosinophil % 04/26/2024 0.1 (L)  0.3 - 6.2 % Final    Basophil % 04/26/2024 0.1  0.0 - 1.5 % Final    Immature Grans % 04/26/2024 0.4  0.0 - 0.5 % Final    Neutrophils, Absolute 04/26/2024 6.32  1.70 - 7.00 10*3/mm3 Final    Lymphocytes, Absolute 04/26/2024 2.01  0.70 - 3.10 10*3/mm3 Final    Monocytes, Absolute 04/26/2024 0.63  0.10 - 0.90 10*3/mm3 Final    Eosinophils, Absolute 04/26/2024 0.01  0.00 - 0.40 10*3/mm3 Final    Basophils, Absolute 04/26/2024 0.01  0.00 - 0.20 10*3/mm3 Final    Immature Grans, Absolute 04/26/2024 0.04  0.00 - 0.05 10*3/mm3 Final    nRBC 04/26/2024 0.0  0.0 - 0.2 /100 WBC Final       EKG Results:  No orders to display       Imaging Results:  No Images in the past 120 days found..      Assessment & Plan   Diagnoses and all orders for this visit:    1. Social anxiety disorder (Primary)  -     mirtazapine (Remeron) 15 MG tablet; Take 1 tablet by mouth Every Night.  Dispense: 30 tablet; Refill: 1    2. Moderate episode of recurrent  major depressive disorder  -     mirtazapine (Remeron) 15 MG tablet; Take 1 tablet by mouth Every Night.  Dispense: 30 tablet; Refill: 1    3. PMDD (premenstrual dysphoric disorder)  -     mirtazapine (Remeron) 15 MG tablet; Take 1 tablet by mouth Every Night.  Dispense: 30 tablet; Refill: 1    4. Insomnia due to other mental disorder  -     mirtazapine (Remeron) 15 MG tablet; Take 1 tablet by mouth Every Night.  Dispense: 30 tablet; Refill: 1              Patient screened positive for depression based on a PHQ-9 score of  on . Follow-up recommendations include: Prescribed antidepressant medication treatment, Suicide Risk Assessment performed, and see assessment below .    Presentation seems most consistent with MDD, social anxiety disorder, PMDD, insomnia.  We will discontinue Cymbalta as patient has not started taking this.  We will start on mirtazapine for management depression, anxiety, insomnia, and overall mood.  Continue therapy.  Instructed patient to contact the office for any new or worsening symptoms or any other concerns.  Follow-up in 1 month.  Addressed all questions and concerns.      Visit Diagnoses:    ICD-10-CM ICD-9-CM   1. Social anxiety disorder  F40.10 300.23   2. Moderate episode of recurrent major depressive disorder  F33.1 296.32   3. PMDD (premenstrual dysphoric disorder)  F32.81 625.4   4. Insomnia due to other mental disorder  F51.05 300.9    F99 327.02               PLAN:  Safety: No acute safety concerns at this time.  Therapy: Will refer for psychotherapy to [facility]  Risk Assessment: Risk of self-harm acutely is moderate to severe.  Risk factors include anxiety disorder, mood disorder, intermittent SI (passive, no plan or intent, no present SI), and recent psychosocial stressors (pandemic). Protective factors include no family history, denies access to guns/weapons, no history of suicide attempts or self-harm in the past, minimal AODA, healthcare seeking, future orientation, willingness  to engage in care.  Risk of self-harm chronically is also moderate to severe, but could be further elevated in the event of treatment noncompliance and/or AODA.  Labs/Diagnostics Ordered:   No orders of the defined types were placed in this encounter.    Medications:   New Medications Ordered This Visit   Medications    mirtazapine (Remeron) 15 MG tablet     Sig: Take 1 tablet by mouth Every Night.     Dispense:  30 tablet     Refill:  1     Discussed all risks, benefits, alternatives, and side effects of Mirtazapine including but not limited to GI upset, sexual dysfunction, weight gain, dizziness, bleeding risk, seizure risk, sedation, headache, activation of more or hypomania, drug-inducted movement disorders (akathisia, dystonia, restless leg syndrome), dyslipidemia, hematologic abnormalities, orthostatic hypotension, sedation, withdrawal syndrome, serotonin syndrome, and activation of suicidal ideation and behavior.  Pt educated on the need to practice safe sex while taking this med. Discussed the need for pt to immediately call the office for any new or worsening symptoms, such as worsening depression; feeling nervous or restless; suicidal thoughts or actions; or other changes changes in mood or behavior, and all other concerns. Pt educated on med compliance and the risks of suddenly stopping this medication or missing doses. Pt verbalized understanding and is agreeable to taking Mirtazapine. Addressed all questions and concerns.       Follow up:   F/u in 1 month.    TREATMENT PLAN/GOALS: Continue supportive psychotherapy efforts and medications as indicated. Treatment and medication options discussed during today's visit. Patient ackowledged and verbally consented to continue with current treatment plan and was educated on the importance of compliance with treatment and follow-up appointments.    MEDICATION ISSUES:  VANESSA reviewed as expected.  Discussed medication options and treatment plan of prescribed  medication as well as the risks, benefits, and side effects including potential falls, possible impaired driving and metabolic adversities among others. Patient is agreeable to call the office with any worsening of symptoms or onset of side effects. Patient is agreeable to call 911 or go to the nearest ER should he/she begin having SI/HI. No medication side effects or related complaints today.            This document has been electronically signed by Janet Wallace PA-C  October 31, 2024 13:56 EDT      Part of this note may be an electronic transcription/translation of spoken language to printed text using the Dragon Dictation System.

## 2024-11-01 ENCOUNTER — OFFICE VISIT (OUTPATIENT)
Dept: FAMILY MEDICINE CLINIC | Facility: CLINIC | Age: 24
End: 2024-11-01
Payer: COMMERCIAL

## 2024-11-01 VITALS
BODY MASS INDEX: 21.69 KG/M2 | SYSTOLIC BLOOD PRESSURE: 92 MMHG | DIASTOLIC BLOOD PRESSURE: 58 MMHG | HEIGHT: 67 IN | TEMPERATURE: 98.3 F | WEIGHT: 138.2 LBS | OXYGEN SATURATION: 99 % | HEART RATE: 94 BPM

## 2024-11-01 DIAGNOSIS — Z30.09 BIRTH CONTROL COUNSELING: Primary | ICD-10-CM

## 2024-11-01 DIAGNOSIS — N92.6 IRREGULAR MENSTRUAL CYCLE: ICD-10-CM

## 2024-11-01 PROCEDURE — 99214 OFFICE O/P EST MOD 30 MIN: CPT

## 2024-11-01 RX ORDER — NORGESTIMATE AND ETHINYL ESTRADIOL 7DAYSX3 28
1 KIT ORAL DAILY
Qty: 28 TABLET | Refills: 12 | Status: SHIPPED | OUTPATIENT
Start: 2024-11-01 | End: 2025-11-01

## 2024-11-01 NOTE — PROGRESS NOTES
Chief Complaint  Chief Complaint   Patient presents with    Contraception     Pt was prescribed Jannet for acne. This medication has caused patient's periods to become irregular and is having cramping.    Acne       Subjective      Juliet Pittman presents to [facility]  History of Present Illness  The patient is a 24-year-old female who comes in today to discuss her current birth control.    She began using Jannet in 05/2024 to help regulate her menstrual cycles and manage acne. However, she does not feel it is helping with her acne and it is causing her to have more irregular periods with abdominal cramping. She has been taking Jannet consistently, but her periods have been irregular, sometimes occurring a week early or late. Her last period was two Saturdays ago. She is seeking an alternative birth control method due to these irregular periods and abdominal cramping. She has tried two other birth control pills in the past, one of which resulted in monthly periods and the other in periods every three months. She has never had a Pap smear.    She started an oral antibiotic, Bactrim, as a precursor to Accutane treatment, which she has not yet started. She has been on Bactrim for 6 months, which has also contributed to the improvement of her skin. However, she finds Bactrim to be a potent medication and has been gradually reducing her intake. She has an upcoming appointment with her dermatologist. She has previously tried doxycycline, but it was ineffective.      Objective     Medical History:  Past Medical History:   Diagnosis Date    Anxiety     Depression     PCOS (polycystic ovarian syndrome)      History reviewed. No pertinent surgical history.   Social History     Tobacco Use    Smoking status: Never     Passive exposure: Never    Smokeless tobacco: Never   Vaping Use    Vaping status: Never Used   Substance Use Topics    Alcohol use: Never    Drug use: Never     Family History   Problem  "Relation Age of Onset    Diabetes Father     Depression Father        Medications:  Prior to Admission medications    Medication Sig Start Date End Date Taking? Authorizing Provider   drospirenone-ethinyl estradiol (YOGESH) 3-0.02 MG per tablet Take 1 tablet by mouth Daily. 4/26/24  Yes Rocío Geronimo APRN   melatonin 1 MG tablet Take  by mouth.   Yes ProviderDavid MD   mirtazapine (Remeron) 15 MG tablet Take 1 tablet by mouth Every Night. 10/31/24  Yes Janet Wallace PA-C   sulfamethoxazole-trimethoprim (BACTRIM DS,SEPTRA DS) 800-160 MG per tablet Take 1 tablet by mouth. 6/30/24  Yes ProviderDavid MD   tretinoin (RETIN-A) 0.05 % cream  8/28/24  Yes Provider, MD David        Allergies:   Amoxicillin, Vilazodone, Vilazodone hcl, and Red dye #40 (allura red)    Health Maintenance Due   Topic Date Due    HPV VACCINES (1 - 3-dose series) Never done    TDAP/TD VACCINES (1 - Tdap) Never done    HEPATITIS C SCREENING  Never done    PAP SMEAR  Never done    COVID-19 Vaccine (1 - 2023-24 season) Never done         Vital Signs:   BP 92/58 (BP Location: Left arm, Patient Position: Sitting, Cuff Size: Adult)   Pulse 94   Temp 98.3 °F (36.8 °C) (Oral)   Ht 170.2 cm (67.01\")   Wt 62.7 kg (138 lb 3.2 oz)   SpO2 99%   BMI 21.64 kg/m²     Wt Readings from Last 3 Encounters:   11/01/24 62.7 kg (138 lb 3.2 oz)   10/31/24 64 kg (141 lb)   08/30/24 62.6 kg (138 lb)     BP Readings from Last 3 Encounters:   11/01/24 92/58   10/31/24 112/60   08/30/24 108/74       BMI is within normal parameters. No other follow-up for BMI required.       Physical Exam  Vitals reviewed.   Constitutional:       Appearance: Normal appearance. She is well-developed.   HENT:      Head: Normocephalic and atraumatic.   Eyes:      Conjunctiva/sclera: Conjunctivae normal.      Pupils: Pupils are equal, round, and reactive to light.   Cardiovascular:      Rate and Rhythm: Normal rate and regular rhythm.   Pulmonary:      Effort: " Pulmonary effort is normal.      Breath sounds: Normal breath sounds.   Skin:     General: Skin is warm and dry.      Findings: Acne present.   Neurological:      Mental Status: She is alert and oriented to person, place, and time.   Psychiatric:         Mood and Affect: Mood and affect normal.         Behavior: Behavior normal.         Thought Content: Thought content normal.         Judgment: Judgment normal.       Physical Exam        Result Review :    The following data was reviewed by ISABELLE Tomlin on 11/01/24 at 13:18 EDT:        No Images in the past 120 days found..    Results                 Assessment and Plan    Diagnoses and all orders for this visit:    1. Birth control counseling (Primary)  -     norgestimate-ethinyl estradiol (ORTHO TRI-CYCLEN,TRINESSA) 0.18/0.215/0.25 MG-35 MCG per tablet; Take 1 tablet by mouth Daily.  Dispense: 28 tablet; Refill: 12    2. Irregular menstrual cycle  -     norgestimate-ethinyl estradiol (ORTHO TRI-CYCLEN,TRINESSA) 0.18/0.215/0.25 MG-35 MCG per tablet; Take 1 tablet by mouth Daily.  Dispense: 28 tablet; Refill: 12       Assessment & Plan  1. Birth control.  She reports that Jannet is not helping with her acne and is causing more irregular periods with abdominal cramping. She will complete her current pack of Jannet and then transition to Ortho Tri-Cyclen starting the Sunday after her next menstrual cycle. A prescription for Ortho Tri-Cyclen was [facility] to [facility].    2. Acne.  She has been on Bactrim for 6 months, which has significantly improved her skin. She has been weaning off Bactrim on her own due to concerns about long-term antibiotic use. She will follow up with dermatology soon with hopes of initiating Accutane.    3. Health Maintenance.  She has never had a Pap smear and was advised to schedule one. She can either have it done here or set up an appointment with gynecology.            Smoking Cessation:    Juliet Pittman  reports that she has never  smoked. She has never been exposed to tobacco smoke. She has never used smokeless tobacco.             Follow Up   Return if symptoms worsen or fail to improve.  Patient was given instructions and counseling regarding her condition or for health maintenance advice. Please see specific information pulled into the AVS if appropriate.     Please note that portions of this note were completed with a voice recognition program.    Patient or patient representative verbalized consent for the use of Ambient Listening during the visit with  ISABELLE Tomlin for chart documentation. 11/1/2024  13:18 EDT

## 2024-12-02 ENCOUNTER — OFFICE VISIT (OUTPATIENT)
Dept: BEHAVIORAL HEALTH | Facility: CLINIC | Age: 24
End: 2024-12-02
Payer: COMMERCIAL

## 2024-12-02 VITALS
HEART RATE: 101 BPM | HEIGHT: 67 IN | DIASTOLIC BLOOD PRESSURE: 72 MMHG | SYSTOLIC BLOOD PRESSURE: 118 MMHG | BODY MASS INDEX: 22.91 KG/M2 | WEIGHT: 146 LBS

## 2024-12-02 DIAGNOSIS — F40.10 SOCIAL ANXIETY DISORDER: ICD-10-CM

## 2024-12-02 DIAGNOSIS — F32.81 PMDD (PREMENSTRUAL DYSPHORIC DISORDER): ICD-10-CM

## 2024-12-02 DIAGNOSIS — F51.05 INSOMNIA DUE TO OTHER MENTAL DISORDER: ICD-10-CM

## 2024-12-02 DIAGNOSIS — F33.1 MODERATE EPISODE OF RECURRENT MAJOR DEPRESSIVE DISORDER: ICD-10-CM

## 2024-12-02 DIAGNOSIS — F99 INSOMNIA DUE TO OTHER MENTAL DISORDER: ICD-10-CM

## 2024-12-02 PROCEDURE — 99214 OFFICE O/P EST MOD 30 MIN: CPT | Performed by: PHYSICIAN ASSISTANT

## 2024-12-02 RX ORDER — MIRTAZAPINE 15 MG/1
15 TABLET, FILM COATED ORAL NIGHTLY
Qty: 30 TABLET | Refills: 1 | Status: SHIPPED | OUTPATIENT
Start: 2024-12-02

## 2024-12-02 NOTE — PROGRESS NOTES
Chief Complaint:  Depression, anxiety     History of Present Illness: Juliet Pittman is a 24 y.o. female who presents today for f/u of mood.  Pt has been taking mirtazapine as prescribed and tolerating well. She has been taking the med for the past 2-3 weeks. Pt c/o sadness and feeling withdrawn that's been constant, rates as mild severity. No crying episodes. Pt has had anhedonia and difficulty with motivation, no change. Pt denies having any SI or HI. Pt c/o difficulty sleeping, no change.  Pt c/o anxiety that comes and goes, only with social interactions. She also c/o irritability, no change. Pt states she feels disconnected from her self, no change. Pt has not been able to reschedule for therapy yet.  Patient states she feels like her mood is directly related to environment.      Medical Record Review: Reviewed office visit note from 1/23/23, Patient was initially started on Vilazodone for depression and anxiety. She had previously tried several other medications but felt that they were not effective. This medication caused her to breakout in hives. She has since stopped taking it and began taking Prozac that was previously prescribed to her by different provider. She would like to continue taking this medication. She has recently started a new job and is trying to go to the gym as well to help with her mental health. She has an upcoming appointment with psychiatry on 2/10/2023.         Reviewed cardiology note from 7/16/24, reviewed assessment - 1.  Patient's EKG shows sinus arrhythmia.  This is a completely normal finding in a young patient.  There are no significant abnormalities.  2.  I did explain to patient that her max heart rate would be close to 200 bpm with exercising.  3.  We also discussed the more cardio people do the longer it takes for the heart rate to come up.  4.  We will plan on placing a 2-week event recorder just to make sure she does not have any abnormal heart rhythms.  She does note some  episodes where she is just laying down and her heart seems to race.  5.  Patient is off her anxiety medication as they noted it could cause an increase in heart rate.  If she needed something in the future for anxiety propranolol might be a good medication and we discussed this.         PHQ-9 Depression Screening:  Little interest or pleasure in doing things?     Feeling down, depressed, or hopeless?     Trouble falling or staying asleep, or sleeping too much?     Feeling tired or having little energy?     Poor appetite or overeating?     Feeling bad about yourself - or that you are a failure or have let yourself or your family down?     Trouble concentrating on things, such as reading the newspaper or watching television?     Moving or speaking so slowly that other people could have noticed? Or the opposite - being so fidgety or restless that you have been moving around a lot more than usual?     Thoughts that you would be better off dead, or of hurting yourself in some way?     PHQ-9 Total Score     If you checked off any problems, how difficult have these problems made it for you to do your work, take care of things at home, or get along with other people?           GOLD-7           ROS:  Review of Systems   Constitutional:  Positive for fatigue. Negative for appetite change, diaphoresis and unexpected weight change.   HENT:  Negative for drooling, tinnitus and trouble swallowing.    Eyes:  Negative for visual disturbance.   Respiratory:  Negative for cough, chest tightness and shortness of breath.    Cardiovascular:  Positive for palpitations. Negative for chest pain.   Gastrointestinal:  Negative for abdominal pain, constipation, diarrhea, nausea and vomiting.   Endocrine: Negative for cold intolerance and heat intolerance.   Genitourinary:  Negative for difficulty urinating.   Musculoskeletal:  Negative for arthralgias and myalgias.   Skin:  Negative for rash.   Allergic/Immunologic: Negative for  immunocompromised state.   Neurological:  Negative for dizziness, tremors, seizures and headaches.   Psychiatric/Behavioral:  Positive for agitation, dysphoric mood and sleep disturbance. Negative for hallucinations, self-injury and suicidal ideas. The patient is nervous/anxious.        Problem List:  Patient Active Problem List   Diagnosis    Generalized anxiety disorder       Current Medications:   Current Outpatient Medications   Medication Sig Dispense Refill    melatonin 1 MG tablet Take  by mouth.      mirtazapine (Remeron) 15 MG tablet Take 1 tablet by mouth Every Night. 30 tablet 1    norgestimate-ethinyl estradiol (ORTHO TRI-CYCLEN,TRINESSA) 0.18/0.215/0.25 MG-35 MCG per tablet Take 1 tablet by mouth Daily. 28 tablet 12    sulfamethoxazole-trimethoprim (BACTRIM DS,SEPTRA DS) 800-160 MG per tablet Take 1 tablet by mouth.      tretinoin (RETIN-A) 0.05 % cream        No current facility-administered medications for this visit.       Discontinued Medications:  Medications Discontinued During This Encounter   Medication Reason    mirtazapine (Remeron) 15 MG tablet Reorder                 Allergy:   Allergies   Allergen Reactions    Amoxicillin Unknown - Low Severity    Vilazodone Hives    Vilazodone Hcl Hives    Red Dye #40 (Allura Red) Rash        Past Medical History:  Past Medical History:   Diagnosis Date    Anxiety     Depression     PCOS (polycystic ovarian syndrome)        Past Surgical History:  No past surgical history on file.    Past Psychiatric History:  Began Treatment: 16 yr/o started therapy   Diagnoses: Depression, anxiety   Psychiatrist: Denies  Therapist: Pt has been in and out of therapy.   Admission History: Denies  Medications/Treatment: Lexapro (did well at first), Zoloft (decreased appetite), viibryd (hives), prozac, wellbutrin  Self Harm: Denies  Suicide Attempts: Denies  Postpartum depression: N/A    Family Psychiatric History:   Diagnoses: Her grandmother has a h/o autism and her father  "has a h/o depression.   Substance use: Denies  Suicide Attempts/Completions: Denies    Family History   Problem Relation Age of Onset    Diabetes Father     Depression Father        Substance Abuse History:   Alcohol use: Rare  Nicotine: Denies  Illicit Drug Use: Denies  Longest Period Sober: Denies  Rehab/AA/NA: Denies    Social History:  Living Situation: Pt lives with her mother.  Marital/Relationship History: Single  Children: Denies  Work History/Occupation: Pt works at the [employer].   Education: Pt completed high school, some college.    History: Denies  Legal: Denies    Social History     Socioeconomic History    Marital status: Single   Tobacco Use    Smoking status: Never     Passive exposure: Never    Smokeless tobacco: Never   Vaping Use    Vaping status: Never Used   Substance and Sexual Activity    Alcohol use: Never    Drug use: Never    Sexual activity: Not Currently     Partners: Male       Developmental History:   Place of birth: Pt was born in [place].   Siblings: Denies  Childhood: Pt reports mother was a single mom and was very broke growing up.       Physical Exam:  Physical Exam  Appearance: Well-groomed with adequate hygiene, appears to be of stated age. Casually and neatly dressed, maintains good eye contact.   Behavior: Appropriate, cooperative. No acute distress.  Motor: No abnormal movements, tics or tremors are noted. No psychomotor agitation or retardation.  Speech: Coherent, spontaneous, appropriate with normal rate, volume, rhythm, and tone. Normal reaction time to questions. No hyperverbal or pressured speech.   Mood: \"I'm okay\"  Affect: Pt appears slightly depressed   Thought content: Negative suicidal ideations, negative homicidal ideations. Patient denies any obsession, compulsion, or phobia. No evidence of delusions.  Perceptions: Negative auditory hallucinations, negative visual hallucinations. Pt does not appear to be actively responding to internal stimuli.   Thought " "process: Logical, goal-directed, coherent, and linear with no evidence of flight of ideas, looseness of associations, thought blocking, circumstantiality, or tangentiality.   Insight/Judgement: Fair/fair  Cognition: Alert and oriented to person, place, and date. Memory intact for recent and remote events. Attention and concentration intact.     I have reexamined the patient and the results are consistent with the previously documented exam. Janet Wallace PA-C           Vital Signs:   /72   Pulse 101   Ht 170.2 cm (67.01\")   Wt 66.2 kg (146 lb)   BMI 22.86 kg/m²      Lab Results:   Hospital Outpatient Visit on 05/29/2024   Component Date Value Ref Range Status    QT Interval 05/29/2024 372  ms Final    QTC Interval 05/29/2024 393  ms Final   Office Visit on 04/26/2024   Component Date Value Ref Range Status    Glucose 04/26/2024 77  65 - 99 mg/dL Final    BUN 04/26/2024 12  6 - 20 mg/dL Final    Creatinine 04/26/2024 0.76  0.57 - 1.00 mg/dL Final    Sodium 04/26/2024 139  136 - 145 mmol/L Final    Potassium 04/26/2024 4.0  3.5 - 5.2 mmol/L Final    Chloride 04/26/2024 105  98 - 107 mmol/L Final    CO2 04/26/2024 24.4  22.0 - 29.0 mmol/L Final    Calcium 04/26/2024 9.5  8.6 - 10.5 mg/dL Final    Total Protein 04/26/2024 6.9  6.0 - 8.5 g/dL Final    Albumin 04/26/2024 4.6  3.5 - 5.2 g/dL Final    ALT (SGPT) 04/26/2024 9  1 - 33 U/L Final    AST (SGOT) 04/26/2024 14  1 - 32 U/L Final    Alkaline Phosphatase 04/26/2024 58  39 - 117 U/L Final    Total Bilirubin 04/26/2024 0.2  0.0 - 1.2 mg/dL Final    Globulin 04/26/2024 2.3  gm/dL Final    A/G Ratio 04/26/2024 2.0  g/dL Final    BUN/Creatinine Ratio 04/26/2024 15.8  7.0 - 25.0 Final    Anion Gap 04/26/2024 9.6  5.0 - 15.0 mmol/L Final    eGFR 04/26/2024 113.8  >60.0 mL/min/1.73 Final    Total Cholesterol 04/26/2024 153  0 - 200 mg/dL Final    Triglycerides 04/26/2024 68  0 - 150 mg/dL Final    HDL Cholesterol 04/26/2024 43  40 - 60 mg/dL Final    LDL " Cholesterol  04/26/2024 97  0 - 100 mg/dL Final    VLDL Cholesterol 04/26/2024 13  5 - 40 mg/dL Final    LDL/HDL Ratio 04/26/2024 2.24   Final    TSH 04/26/2024 1.450  0.270 - 4.200 uIU/mL Final    HCG, Urine, QL 04/26/2024 Negative  Negative Final    Lot Number 04/26/2024 [id number]   Final    Internal Positive Control 04/26/2024 Passed  Positive, Passed Final    Internal Negative Control 04/26/2024 Passed  Negative, Passed Final    Expiration Date 04/26/2024 04/08/2025   Final    WBC 04/26/2024 9.02  3.40 - 10.80 10*3/mm3 Final    RBC 04/26/2024 3.95  3.77 - 5.28 10*6/mm3 Final    Hemoglobin 04/26/2024 12.7  12.0 - 15.9 g/dL Final    Hematocrit 04/26/2024 38.0  34.0 - 46.6 % Final    MCV 04/26/2024 96.2  79.0 - 97.0 fL Final    MCH 04/26/2024 32.2  26.6 - 33.0 pg Final    MCHC 04/26/2024 33.4  31.5 - 35.7 g/dL Final    RDW 04/26/2024 11.8 (L)  12.3 - 15.4 % Final    RDW-SD 04/26/2024 41.6  37.0 - 54.0 fl Final    MPV 04/26/2024 11.0  6.0 - 12.0 fL Final    Platelets 04/26/2024 290  140 - 450 10*3/mm3 Final    Neutrophil % 04/26/2024 70.1  42.7 - 76.0 % Final    Lymphocyte % 04/26/2024 22.3  19.6 - 45.3 % Final    Monocyte % 04/26/2024 7.0  5.0 - 12.0 % Final    Eosinophil % 04/26/2024 0.1 (L)  0.3 - 6.2 % Final    Basophil % 04/26/2024 0.1  0.0 - 1.5 % Final    Immature Grans % 04/26/2024 0.4  0.0 - 0.5 % Final    Neutrophils, Absolute 04/26/2024 6.32  1.70 - 7.00 10*3/mm3 Final    Lymphocytes, Absolute 04/26/2024 2.01  0.70 - 3.10 10*3/mm3 Final    Monocytes, Absolute 04/26/2024 0.63  0.10 - 0.90 10*3/mm3 Final    Eosinophils, Absolute 04/26/2024 0.01  0.00 - 0.40 10*3/mm3 Final    Basophils, Absolute 04/26/2024 0.01  0.00 - 0.20 10*3/mm3 Final    Immature Grans, Absolute 04/26/2024 0.04  0.00 - 0.05 10*3/mm3 Final    nRBC 04/26/2024 0.0  0.0 - 0.2 /100 WBC Final       EKG Results:  No orders to display       Imaging Results:  No Images in the past 120 days found..      Assessment & Plan   Diagnoses and all orders  for this visit:    1. Social anxiety disorder  -     mirtazapine (Remeron) 15 MG tablet; Take 1 tablet by mouth Every Night.  Dispense: 30 tablet; Refill: 1    2. Moderate episode of recurrent major depressive disorder  -     mirtazapine (Remeron) 15 MG tablet; Take 1 tablet by mouth Every Night.  Dispense: 30 tablet; Refill: 1    3. PMDD (premenstrual dysphoric disorder)  -     mirtazapine (Remeron) 15 MG tablet; Take 1 tablet by mouth Every Night.  Dispense: 30 tablet; Refill: 1    4. Insomnia due to other mental disorder  -     mirtazapine (Remeron) 15 MG tablet; Take 1 tablet by mouth Every Night.  Dispense: 30 tablet; Refill: 1            Patient screened positive for depression based on a PHQ-9 score of  on . Follow-up recommendations include: Prescribed antidepressant medication treatment, Suicide Risk Assessment performed, and see assessment below .    Presentation seems most consistent with MDD, social anxiety disorder, PMDD, insomnia.  We will continue mirtazapine for management depression, anxiety, insomnia, and overall mood.  Discussed giving medication more time to work as it has only been 2 to 3 weeks.  Patient is very hesitant about meds and declines further changes at this time.  Reiterated need for psychotherapy, patient plans on contacting today.  Instructed patient to contact the office for any new or worsening symptoms or any other concerns.  Follow-up in 1 month.  Addressed all questions and concerns.    I have reviewed the assessment and plan and verified the accuracy of it. No changes to assessment and plan since the information was documented. Janet Wallace PA-C 12/02/24         Visit Diagnoses:    ICD-10-CM ICD-9-CM   1. Social anxiety disorder  F40.10 300.23   2. Moderate episode of recurrent major depressive disorder  F33.1 296.32   3. PMDD (premenstrual dysphoric disorder)  F32.81 625.4   4. Insomnia due to other mental disorder  F51.05 300.9    F99 327.02             PLAN:  Safety: No  acute safety concerns at this time.  Therapy: Will refer for psychotherapy to [facility]  Risk Assessment: Risk of self-harm acutely is moderate to severe.  Risk factors include anxiety disorder, mood disorder, intermittent SI (passive, no plan or intent, no present SI), and recent psychosocial stressors (pandemic). Protective factors include no family history, denies access to guns/weapons, no history of suicide attempts or self-harm in the past, minimal AODA, healthcare seeking, future orientation, willingness to engage in care.  Risk of self-harm chronically is also moderate to severe, but could be further elevated in the event of treatment noncompliance and/or AODA.  Labs/Diagnostics Ordered:   No orders of the defined types were placed in this encounter.    Medications:   New Medications Ordered This Visit   Medications    mirtazapine (Remeron) 15 MG tablet     Sig: Take 1 tablet by mouth Every Night.     Dispense:  30 tablet     Refill:  1     Discussed all risks, benefits, alternatives, and side effects of Mirtazapine including but not limited to GI upset, sexual dysfunction, weight gain, dizziness, bleeding risk, seizure risk, sedation, headache, activation of more or hypomania, drug-inducted movement disorders (akathisia, dystonia, restless leg syndrome), dyslipidemia, hematologic abnormalities, orthostatic hypotension, sedation, withdrawal syndrome, serotonin syndrome, and activation of suicidal ideation and behavior.  Pt educated on the need to practice safe sex while taking this med. Discussed the need for pt to immediately call the office for any new or worsening symptoms, such as worsening depression; feeling nervous or restless; suicidal thoughts or actions; or other changes changes in mood or behavior, and all other concerns. Pt educated on med compliance and the risks of suddenly stopping this medication or missing doses. Pt verbalized understanding and is agreeable to taking Mirtazapine. Addressed all  questions and concerns.       Follow up:   F/u in 1 month.    TREATMENT PLAN/GOALS: Continue supportive psychotherapy efforts and medications as indicated. Treatment and medication options discussed during today's visit. Patient ackowledged and verbally consented to continue with current treatment plan and was educated on the importance of compliance with treatment and follow-up appointments.    MEDICATION ISSUES:  VANESSA reviewed as expected.  Discussed medication options and treatment plan of prescribed medication as well as the risks, benefits, and side effects including potential falls, possible impaired driving and metabolic adversities among others. Patient is agreeable to call the office with any worsening of symptoms or onset of side effects. Patient is agreeable to call 911 or go to the nearest ER should he/she begin having SI/HI. No medication side effects or related complaints today.            This document has been electronically signed by Janet Wallace PA-C  December 2, 2024 11:19 EST      Part of this note may be an electronic transcription/translation of spoken language to printed text using the Dragon Dictation System.

## 2025-03-20 ENCOUNTER — TELEPHONE (OUTPATIENT)
Dept: PSYCHIATRY | Facility: CLINIC | Age: 25
End: 2025-03-20
Payer: COMMERCIAL

## 2025-03-20 DIAGNOSIS — F33.1 MODERATE EPISODE OF RECURRENT MAJOR DEPRESSIVE DISORDER: ICD-10-CM

## 2025-03-20 DIAGNOSIS — F32.81 PMDD (PREMENSTRUAL DYSPHORIC DISORDER): ICD-10-CM

## 2025-03-20 DIAGNOSIS — F51.05 INSOMNIA DUE TO OTHER MENTAL DISORDER: ICD-10-CM

## 2025-03-20 DIAGNOSIS — F40.10 SOCIAL ANXIETY DISORDER: ICD-10-CM

## 2025-03-20 DIAGNOSIS — F99 INSOMNIA DUE TO OTHER MENTAL DISORDER: ICD-10-CM

## 2025-03-20 RX ORDER — MIRTAZAPINE 15 MG/1
15 TABLET, FILM COATED ORAL NIGHTLY
Qty: 30 TABLET | Refills: 5 | Status: SHIPPED | OUTPATIENT
Start: 2025-03-20

## 2025-03-20 NOTE — TELEPHONE ENCOUNTER
The [facility] system can transfer non controlled prescriptions among themselves over state lines

## 2025-03-20 NOTE — TELEPHONE ENCOUNTER
PT LVM.    SHE HAS MOVED TO [facility].    SHE IS IN THE PROCESS OF MOVING HER MEDICAL STUFF AROUND TO HER NEW LOCATION.    SHE IS ASKING IF PROVIDER CAN REFILL HER MEDICATION REMERON FOR UP TO 90 DAYS UNTIL SHE CAN ESTABLISH CARE IN [place].    mirtazapine (Remeron) 15 MG tablet (12/02/2024)